# Patient Record
Sex: FEMALE | Race: WHITE | Employment: OTHER | URBAN - METROPOLITAN AREA
[De-identification: names, ages, dates, MRNs, and addresses within clinical notes are randomized per-mention and may not be internally consistent; named-entity substitution may affect disease eponyms.]

---

## 2018-02-07 ENCOUNTER — OFFICE VISIT (OUTPATIENT)
Dept: SURGERY | Age: 73
End: 2018-02-07

## 2018-02-07 VITALS
SYSTOLIC BLOOD PRESSURE: 163 MMHG | OXYGEN SATURATION: 99 % | WEIGHT: 235 LBS | DIASTOLIC BLOOD PRESSURE: 88 MMHG | BODY MASS INDEX: 36.88 KG/M2 | RESPIRATION RATE: 16 BRPM | HEART RATE: 88 BPM | HEIGHT: 67 IN

## 2018-02-07 DIAGNOSIS — I25.118 CORONARY ARTERY DISEASE OF NATIVE HEART WITH STABLE ANGINA PECTORIS, UNSPECIFIED VESSEL OR LESION TYPE (HCC): ICD-10-CM

## 2018-02-07 DIAGNOSIS — I77.9 LEFT-SIDED CAROTID ARTERY DISEASE (HCC): ICD-10-CM

## 2018-02-07 RX ORDER — DILTIAZEM HYDROCHLORIDE 120 MG/1
CAPSULE, COATED, EXTENDED RELEASE ORAL DAILY
COMMUNITY

## 2018-02-07 RX ORDER — ISOSORBIDE MONONITRATE 60 MG/1
TABLET, EXTENDED RELEASE ORAL
COMMUNITY

## 2018-02-07 RX ORDER — ATORVASTATIN CALCIUM 20 MG/1
TABLET, FILM COATED ORAL DAILY
COMMUNITY

## 2018-02-07 RX ORDER — ASPIRIN 81 MG/1
TABLET ORAL DAILY
COMMUNITY

## 2018-02-07 RX ORDER — SERTRALINE HYDROCHLORIDE 100 MG/1
TABLET, FILM COATED ORAL DAILY
COMMUNITY

## 2018-02-07 RX ORDER — METFORMIN HYDROCHLORIDE 500 MG/1
TABLET ORAL 2 TIMES DAILY WITH MEALS
COMMUNITY

## 2018-02-07 RX ORDER — FENOFIBRATE 160 MG/1
160 TABLET ORAL DAILY
COMMUNITY

## 2018-02-07 RX ORDER — LABETALOL 200 MG/1
TABLET, FILM COATED ORAL 2 TIMES DAILY
COMMUNITY

## 2018-02-07 NOTE — PATIENT INSTRUCTIONS
Body Mass Index: Care Instructions  Your Care Instructions    Body mass index (BMI) can help you see if your weight is raising your risk for health problems. It uses a formula to compare how much you weigh with how tall you are. · A BMI lower than 18.5 is considered underweight. · A BMI between 18.5 and 24.9 is considered healthy. · A BMI between 25 and 29.9 is considered overweight. A BMI of 30 or higher is considered obese. If your BMI is in the normal range, it means that you have a lower risk for weight-related health problems. If your BMI is in the overweight or obese range, you may be at increased risk for weight-related health problems, such as high blood pressure, heart disease, stroke, arthritis or joint pain, and diabetes. If your BMI is in the underweight range, you may be at increased risk for health problems such as fatigue, lower protection (immunity) against illness, muscle loss, bone loss, hair loss, and hormone problems. BMI is just one measure of your risk for weight-related health problems. You may be at higher risk for health problems if you are not active, you eat an unhealthy diet, or you drink too much alcohol or use tobacco products. Follow-up care is a key part of your treatment and safety. Be sure to make and go to all appointments, and call your doctor if you are having problems. It's also a good idea to know your test results and keep a list of the medicines you take. How can you care for yourself at home? · Practice healthy eating habits. This includes eating plenty of fruits, vegetables, whole grains, lean protein, and low-fat dairy. · If your doctor recommends it, get more exercise. Walking is a good choice. Bit by bit, increase the amount you walk every day. Try for at least 30 minutes on most days of the week. · Do not smoke. Smoking can increase your risk for health problems. If you need help quitting, talk to your doctor about stop-smoking programs and medicines. These can increase your chances of quitting for good. · Limit alcohol to 2 drinks a day for men and 1 drink a day for women. Too much alcohol can cause health problems. If you have a BMI higher than 25  · Your doctor may do other tests to check your risk for weight-related health problems. This may include measuring the distance around your waist. A waist measurement of more than 40 inches in men or 35 inches in women can increase the risk of weight-related health problems. · Talk with your doctor about steps you can take to stay healthy or improve your health. You may need to make lifestyle changes to lose weight and stay healthy, such as changing your diet and getting regular exercise. If you have a BMI lower than 18.5  · Your doctor may do other tests to check your risk for health problems. · Talk with your doctor about steps you can take to stay healthy or improve your health. You may need to make lifestyle changes to gain or maintain weight and stay healthy, such as getting more healthy foods in your diet and doing exercises to build muscle. Where can you learn more? Go to http://yoav-wilian.info/. Enter S176 in the search box to learn more about \"Body Mass Index: Care Instructions. \"  Current as of: October 13, 2016  Content Version: 11.4  © 7444-5132 [employer]. Care instructions adapted under license by [employer] (which disclaims liability or warranty for this information). If you have questions about a medical condition or this instruction, always ask your healthcare professional. [place] [address] any warranty or liability for your use of this information. Patient Instructions      1. Continue to monitor carbohydrate and protein intake- remember to keep your           total  carbohydrates to 50 grams or less per day for best results.   2. Remember hydration goals - usually 48 to 64 ounces of liquids per day  3. Continue to work towards exercise goals - minimum 3 days per week of 45          minutes to  1 hour at a time. 4. Remember to take vitamins as directed        Supplement Resource Guide    Importance of Protein:   Maintains lean body mass, produces antibodies to fight off infections, heals wounds, minimizes hair loss, helps to give you energy, helps with satiety, and keeping you full between meals. Importance of Calcium:  Needed for healthy bones and teeth, normal blood clotting, and nervous system functioning, higher risk of osteoporosis and bone disease with non-compliance. Importance of Multivitamins: Many functions. Supply you with extra nutrients that you may be missing from food. May lead to iron deficiency anemia, weakness, fatigue, and many other symptoms with non-compliance. Importance of B Vitamins:  Important for red blood cell formation, metabolism, energy, and helps to maintain a healthy nervous system. Protein Supplement  Find one you like now. Use immediately after surgery. Look for:  35-50g protein each day from your protein supplement once you reach the progression diet. 0-3 g fat per serving  0-3 g sugar per serving    Protein drinks should be split in separate dosages. Recommend: Lifelong  1 year + Calcium Supplement:     Start taking within a month after surgery. Look for: Calcium Citrate Plus D (1500 mg per day)  Recommend: Citracal     .          Avoid chocolate chewable calcium. Can use chewable bariatric or [employer] brand or similar chewable. The body cannot absorb more than 500-600 mg @ a time. Take for Life Multi-vitamin Supplement:      1st Month After Surgery: Any complete chewable, such as: [employer]s Complete chewables. Avoid [employer] sours or gummies. They lack iron and other important nutrients and also have added sugar.     Continue with chewable vitamin or change to adult complete multivitamin one month after surgery. Menstruating women can take a prenatal vitamin. Make sure has at least 18 mg iron and 818-290 mcg folic acid):    Vitamin B12, B Complex Vitamin, and Biotin  Start taking within a month after surgery. Vitamin B12:  1000 mcg of Vitamin B12 three times weekly    Must take sublingually (meaning you take it under your tongue) or in a liquid drop form for easy absorption. B Complex Vitamin: Take a pill or liquid drop form once daily. Biotin: This vitamin can help prevent hair loss.     Recommend 5mg   (5000 mcg) a day  Biotin is Optional

## 2018-02-07 NOTE — PROGRESS NOTES
Subjective:     Vibha Roblero  is a 71 y.o. female who presents for follow-up about 11 years following laparoscopic adjustable gastric band surgery. She has lost a total of 30 pounds since surgery. Body mass index is 36.81 kg/(m^2). Brenna Walker The patient presents today to assess their progress toward their goal of weight loss and to address any issues that may be present. Today the patient and I have reviewed their diet and how appropriate their food choices are. The following issues have been identified : feels little restriction. We loosened her band 2 years ago due to vomiting and esophageal dilation. .  Surgery related complication: 0       She reports no issues other than hunger and denies vomiting and abdominal pain. The patients diet choices have been reviewed today and are  good  Patients pain score:0    The patient's exercise level: moderately active. Changes in her medical history and medications have been reviewed.     Patient Active Problem List   Diagnosis Code    Sleep apnea G47.30    Diabetes ([address] E11.9    Hypertension I10    Hypercholesterolemia E78.00    Arthritis M19.90    Carotid arterial disease ([address] I77.9    Coronary artery disease I25.10     Past Medical History:   Diagnosis Date    Arthritis     Carotid arterial disease (HCC)      s/p left stent    Coronary artery disease     s/p stents x 3    Diabetes ([address]     Diabetes ([address]     Female stress incontinence     GERD (gastroesophageal reflux disease)     Hypercholesterolemia     Hypertension     Mixed hyperlipidemia     Morbid obesity ([address]     Sleep apnea      Past Surgical History:   Procedure Laterality Date    LAP, PLACE ADJUST GASTR BAND  3/20/07          Review of Symptoms:       General - No history or complaints of unexpected fever or chills  Head/Neck - No history or complaints of headache or dizziness  Cardiac - No history or complaints of chest pain, palpitations, or shortness of breath  Pulmonary - No history or complaints of shortness of breath or productive cough  Gastrointestinal - as noted above  Genitourinary - No history or complaints of hematuria/dysuria or renal lithiasis  Musculoskeletal - No history or complaints of joint  muscular weakness  Hematologic - No history of any bleeding episodes  Neurologic - No history or complaints of  migraine headaches or neurologic symptoms                     Objective:     Visit Vitals    /88 (BP 1 Location: Left arm, BP Patient Position: Sitting)    Pulse 88    Resp 16    Ht 5' 7\" (1.702 m)    Wt 106.6 kg (235 lb)    SpO2 99%    BMI 36.81 kg/m2        Physical Exam:    General:  alert, cooperative, no distress, appears stated age   Lungs:   clear to auscultation bilaterally   Heart:  Regular rate and rhythm   Abdomen:   abdomen is soft without significant tenderness, masses, organomegaly or guarding; Incisions: healing well       Lab Results   Component Value Date/Time    WBC 12.8 02/12/2012 04:55 AM    Hemoglobin, POC 10.5 (L) 02/09/2012 07:37 PM    HGB 9.1 (L) 02/12/2012 04:55 AM    Hematocrit, POC 31 (L) 02/09/2012 07:37 PM    HCT 27.3 (L) 02/12/2012 04:55 AM    PLATELET 334 59/81/0759 04:55 AM    MCV 82.7 02/12/2012 04:55 AM     Lab Results   Component Value Date/Time    Sodium 139 02/13/2012 04:43 AM    Potassium 3.9 02/13/2012 04:43 AM    Chloride 105 02/13/2012 04:43 AM    CO2 28 02/13/2012 04:43 AM    Anion gap 6 02/13/2012 04:43 AM    Glucose 112 (H) 02/13/2012 04:43 AM    BUN 14 02/13/2012 04:43 AM    Creatinine 0.8 02/13/2012 04:43 AM    BUN/Creatinine ratio 18 02/13/2012 04:43 AM    GFR est AA >60 02/13/2012 04:43 AM    GFR est non-AA >60 02/13/2012 04:43 AM    Calcium 8.7 02/13/2012 04:43 AM    Bilirubin, total 0.4 08/15/2011 04:10 AM    AST (SGOT) 42 (H) 08/15/2011 04:10 AM    Alk.  phosphatase 77 08/15/2011 04:10 AM    Protein, total 6.3 (L) 08/15/2011 04:10 AM    Albumin 3.1 (L) 08/15/2011 04:10 AM    Globulin 3.2 08/15/2011 04:10 AM    A-G Ratio 1.0 08/15/2011 04:10 AM    ALT (SGPT) 49 08/15/2011 04:10 AM     No results found for: IRON, FE, TIBC, IBCT, PSAT, FERR  No results found for: FOL, RBCF  No results found for: VITD3, Sujata Busby, XQVID, VD3RIA        Assessment:     1. History of Morbid obesity, status post  laparoscopic adjustable gastric band surgery. Requests fill today. Plan:     1. Remember to measure portions, continue low carbohydrate diet  2. Continue to concentrate on protein intake meeting daily requirements  3. Remember vitamin supplements. The importance of such was discussed regarding the malabsorptive issues that the surgery creates. 4. Exercise regimen appears to be: fair  5. Try and attend support group if feasible. 6. Follow-up in 1 month(s) for UGI. 7. Will perform adjustment today  8. Total time spent with the patient 30 minutes. [facility]  OFFICE PROCEDURE PROGRESS NOTE        Chart reviewed for the following:   Tyson Merrill MD, have reviewed the History, Physical and updated the Allergic reactions for TOBESOFT performed immediately prior to start of procedure:   Tyson Merrill MD, have performed the following reviews on Jesus Corral prior to the start of the procedure:            * Patient was identified by name and date of birth   * Agreement on procedure being performed was verified  * Risks and Benefits explained to the patient  * Procedure site verified and marked as necessary  * Patient was positioned for comfort  * Consent was signed and verified     Time: 10:00am      Date of procedure: 2/7/2018    Procedure performed by:   Pippa Murphy MD    Provider assisted by: none    Patient assisted by: self    How tolerated by patient: tolerated the procedure well with no complications    Post Procedural Pain Scale: 0 - No Hurt    Comments: none                    Subjective:   Jesus Corral is a 71 y.o. female with previous lap band surgery, who is here today needing lap band adjustment because of decreased satiety (pt. able to eat > 4 oz at one meal), early sense of hunger (within 1-2 hours after eating) and poor weight loss. Dietary compliance is good. Objective:     Visit Vitals    /88 (BP 1 Location: Left arm, BP Patient Position: Sitting)    Pulse 88    Resp 16    Ht 5' 7\" (1.702 m)    Wt 106.6 kg (235 lb)    SpO2 99%    BMI 36.81 kg/m2      Estimated body mass index is 36.81 kg/(m^2) as calculated from the following:    Height as of this encounter: 5' 7\" (1.702 m). Weight as of this encounter: 106.6 kg (235 lb). Wt Readings from Last 3 Encounters:   02/07/18 106.6 kg (235 lb)   10/21/15 99.3 kg (218 lb 14.4 oz)   12/19/12 104 kg (229 lb 6 oz)     Her change in weight since last visit: gained, 15 lbs. The surgical area looks well healed and the port is properly located. Band Position: n/a. Assessment/Plan: Morbid Obesity, status post lap-band surgery, needing fill adjustment    Lap Band FiIl Procedure    The port area was cleaned with alcohol and a 20 gauge needle was inserted. Previous Fill Volume:  7.3 cc. Added:  .3 cc   Total amount now in Band 7.6 ml    Patient tolerated the procedure well.   Follow up in 4 to 6 weeks for UGI

## 2020-12-29 ENCOUNTER — TRANSCRIBE ORDER (OUTPATIENT)
Dept: SCHEDULING | Age: 75
End: 2020-12-29

## 2020-12-29 DIAGNOSIS — M25.562 LEFT KNEE PAIN: Primary | ICD-10-CM

## 2021-01-04 ENCOUNTER — HOSPITAL ENCOUNTER (OUTPATIENT)
Dept: NUCLEAR MEDICINE | Age: 76
Discharge: HOME | End: 2021-01-04
Attending: ORTHOPAEDIC SURGERY
Payer: MEDICARE

## 2021-01-04 DIAGNOSIS — M25.562 LEFT KNEE PAIN: ICD-10-CM

## 2021-01-04 PROCEDURE — A9503 TC99M MEDRONATE: HCPCS

## 2023-04-26 ENCOUNTER — HOSPITAL ENCOUNTER (OUTPATIENT)
Facility: HOSPITAL | Age: 78
Discharge: HOME | End: 2023-04-29
Payer: MEDICARE

## 2023-04-26 DIAGNOSIS — M16.12 PRIMARY OSTEOARTHRITIS OF LEFT HIP: ICD-10-CM

## 2023-04-26 LAB
ABO + RH BLD: NORMAL
ALBUMIN SERPL-MCNC: 3.4 G/DL (ref 3.4–5)
ALBUMIN/GLOB SERPL: 1 (ref 0.8–1.7)
ALP SERPL-CCNC: 102 U/L (ref 45–117)
ALT SERPL-CCNC: 28 U/L (ref 13–56)
ANION GAP SERPL CALC-SCNC: 6 MMOL/L (ref 3–18)
APPEARANCE UR: ABNORMAL
APTT PPP: 30.6 SEC (ref 23–36.4)
AST SERPL-CCNC: 23 U/L (ref 10–38)
BILIRUB SERPL-MCNC: 0.6 MG/DL (ref 0.2–1)
BILIRUB UR QL: NEGATIVE
BLOOD GROUP ANTIBODIES SERPL: NORMAL
BUN SERPL-MCNC: 21 MG/DL (ref 7–18)
BUN/CREAT SERPL: 22 (ref 12–20)
CALCIUM SERPL-MCNC: 10.4 MG/DL (ref 8.5–10.1)
CAOX CRY URNS QL MICRO: ABNORMAL
CHLORIDE SERPL-SCNC: 106 MMOL/L (ref 100–111)
CO2 SERPL-SCNC: 29 MMOL/L (ref 21–32)
COLOR UR: ABNORMAL
CREAT SERPL-MCNC: 0.94 MG/DL (ref 0.6–1.3)
EPITH CASTS URNS QL MICRO: ABNORMAL /LPF (ref 0–5)
ERYTHROCYTE [SEDIMENTATION RATE] IN BLOOD: 33 MM/HR (ref 0–30)
EST. AVERAGE GLUCOSE BLD GHB EST-MCNC: 140 MG/DL
GLOBULIN SER CALC-MCNC: 3.3 G/DL (ref 2–4)
GLUCOSE SERPL-MCNC: 130 MG/DL (ref 74–99)
GLUCOSE UR STRIP.AUTO-MCNC: >1000 MG/DL
HBA1C MFR BLD: 6.5 % (ref 4.2–5.6)
HGB UR QL STRIP: ABNORMAL
HYALINE CASTS URNS QL MICRO: ABNORMAL /LPF (ref 0–2)
INR PPP: 1.1 (ref 0.8–1.2)
KETONES UR QL STRIP.AUTO: NEGATIVE MG/DL
LEUKOCYTE ESTERASE UR QL STRIP.AUTO: ABNORMAL
NITRITE UR QL STRIP.AUTO: NEGATIVE
PH UR STRIP: 5 (ref 5–8)
POTASSIUM SERPL-SCNC: 4.4 MMOL/L (ref 3.5–5.5)
PROT SERPL-MCNC: 6.7 G/DL (ref 6.4–8.2)
PROT UR STRIP-MCNC: 100 MG/DL
PROTHROMBIN TIME: 15 SEC (ref 11.5–15.2)
RBC #/AREA URNS HPF: ABNORMAL /HPF (ref 0–5)
SODIUM SERPL-SCNC: 141 MMOL/L (ref 136–145)
SP GR UR REFRACTOMETRY: >1.03 (ref 1–1.03)
SPECIMEN EXP DATE BLD: NORMAL
UROBILINOGEN UR QL STRIP.AUTO: 0.2 EU/DL (ref 0.2–1)
WBC URNS QL MICRO: ABNORMAL /HPF (ref 0–5)

## 2023-04-26 PROCEDURE — 86900 BLOOD TYPING SEROLOGIC ABO: CPT

## 2023-04-26 PROCEDURE — 86850 RBC ANTIBODY SCREEN: CPT

## 2023-04-26 PROCEDURE — 85652 RBC SED RATE AUTOMATED: CPT

## 2023-04-26 PROCEDURE — 86901 BLOOD TYPING SEROLOGIC RH(D): CPT

## 2023-04-26 PROCEDURE — 85610 PROTHROMBIN TIME: CPT

## 2023-04-26 PROCEDURE — 93005 ELECTROCARDIOGRAM TRACING: CPT | Performed by: ORTHOPAEDIC SURGERY

## 2023-04-26 PROCEDURE — 81001 URINALYSIS AUTO W/SCOPE: CPT

## 2023-04-26 PROCEDURE — 80053 COMPREHEN METABOLIC PANEL: CPT

## 2023-04-26 PROCEDURE — 36415 COLL VENOUS BLD VENIPUNCTURE: CPT

## 2023-04-26 PROCEDURE — 85730 THROMBOPLASTIN TIME PARTIAL: CPT

## 2023-04-26 PROCEDURE — 83036 HEMOGLOBIN GLYCOSYLATED A1C: CPT

## 2023-04-26 PROCEDURE — 87086 URINE CULTURE/COLONY COUNT: CPT

## 2023-04-27 LAB
BACTERIA SPEC CULT: NORMAL
BACTERIA SPEC CULT: NORMAL
EKG DIAGNOSIS: NORMAL
EKG Q-T INTERVAL: 414 MS
EKG QRS DURATION: 86 MS
EKG QTC CALCULATION (BAZETT): 483 MS
EKG R AXIS: 59 DEGREES
EKG T AXIS: 40 DEGREES
EKG VENTRICULAR RATE: 82 BPM
SERVICE CMNT-IMP: NORMAL

## 2023-04-28 LAB
BACTERIA SPEC CULT: NORMAL
CC UR VC: NORMAL
SERVICE CMNT-IMP: NORMAL

## 2023-08-03 ENCOUNTER — HOSPITAL ENCOUNTER (OUTPATIENT)
Facility: HOSPITAL | Age: 78
Discharge: HOME | End: 2023-08-03
Payer: MEDICARE

## 2023-08-03 DIAGNOSIS — M16.12 PRIMARY OSTEOARTHRITIS OF LEFT HIP: ICD-10-CM

## 2023-08-03 LAB
ALBUMIN SERPL-MCNC: 3.9 G/DL (ref 3.4–5)
ALBUMIN/GLOB SERPL: 1.1 (ref 0.8–1.7)
ALP SERPL-CCNC: 111 U/L (ref 45–117)
ALT SERPL-CCNC: 16 U/L (ref 13–56)
ANION GAP SERPL CALC-SCNC: 3 MMOL/L (ref 3–18)
APPEARANCE UR: ABNORMAL
APTT PPP: 30.6 SEC (ref 23–36.4)
AST SERPL-CCNC: 13 U/L (ref 10–38)
BACTERIA URNS QL MICRO: ABNORMAL /HPF
BILIRUB SERPL-MCNC: 0.6 MG/DL (ref 0.2–1)
BILIRUB UR QL: NEGATIVE
BUN SERPL-MCNC: 24 MG/DL (ref 7–18)
BUN/CREAT SERPL: 22 (ref 12–20)
CALCIUM SERPL-MCNC: 10.7 MG/DL (ref 8.5–10.1)
CAOX CRY URNS QL MICRO: ABNORMAL
CHLORIDE SERPL-SCNC: 107 MMOL/L (ref 100–111)
CO2 SERPL-SCNC: 29 MMOL/L (ref 21–32)
COLOR UR: YELLOW
CREAT SERPL-MCNC: 1.07 MG/DL (ref 0.6–1.3)
EPITH CASTS URNS QL MICRO: ABNORMAL /LPF (ref 0–5)
ERYTHROCYTE [DISTWIDTH] IN BLOOD BY AUTOMATED COUNT: 17.2 % (ref 11.6–14.5)
ERYTHROCYTE [SEDIMENTATION RATE] IN BLOOD: 3 MM/HR (ref 0–30)
EST. AVERAGE GLUCOSE BLD GHB EST-MCNC: 123 MG/DL
GLOBULIN SER CALC-MCNC: 3.5 G/DL (ref 2–4)
GLUCOSE SERPL-MCNC: 129 MG/DL (ref 74–99)
GLUCOSE UR STRIP.AUTO-MCNC: >1000 MG/DL
HBA1C MFR BLD: 5.9 % (ref 4.2–5.6)
HCT VFR BLD AUTO: 49.9 % (ref 35–45)
HGB BLD-MCNC: 15.5 G/DL (ref 12–16)
HGB UR QL STRIP: NEGATIVE
INR PPP: 1.3 (ref 0.9–1.1)
KETONES UR QL STRIP.AUTO: ABNORMAL MG/DL
LEUKOCYTE ESTERASE UR QL STRIP.AUTO: ABNORMAL
MCH RBC QN AUTO: 25.7 PG (ref 24–34)
MCHC RBC AUTO-ENTMCNC: 31.1 G/DL (ref 31–37)
MCV RBC AUTO: 82.9 FL (ref 78–100)
NITRITE UR QL STRIP.AUTO: POSITIVE
NRBC # BLD: 0 K/UL (ref 0–0.01)
NRBC BLD-RTO: 0 PER 100 WBC
PH UR STRIP: 5.5 (ref 5–8)
PLATELET # BLD AUTO: 255 K/UL (ref 135–420)
PMV BLD AUTO: 11.3 FL (ref 9.2–11.8)
POTASSIUM SERPL-SCNC: 4.8 MMOL/L (ref 3.5–5.5)
PROT SERPL-MCNC: 7.4 G/DL (ref 6.4–8.2)
PROT UR STRIP-MCNC: 30 MG/DL
PROTHROMBIN TIME: 16.1 SEC (ref 11.9–14.7)
RBC # BLD AUTO: 6.02 M/UL (ref 4.2–5.3)
RBC #/AREA URNS HPF: ABNORMAL /HPF (ref 0–5)
SODIUM SERPL-SCNC: 139 MMOL/L (ref 136–145)
SP GR UR REFRACTOMETRY: >1.03 (ref 1–1.03)
UROBILINOGEN UR QL STRIP.AUTO: 1 EU/DL (ref 0.2–1)
WBC # BLD AUTO: 10.8 K/UL (ref 4.6–13.2)
WBC URNS QL MICRO: ABNORMAL /HPF (ref 0–5)

## 2023-08-03 PROCEDURE — 81001 URINALYSIS AUTO W/SCOPE: CPT

## 2023-08-03 PROCEDURE — 36415 COLL VENOUS BLD VENIPUNCTURE: CPT

## 2023-08-03 PROCEDURE — 87086 URINE CULTURE/COLONY COUNT: CPT

## 2023-08-03 PROCEDURE — 80053 COMPREHEN METABOLIC PANEL: CPT

## 2023-08-03 PROCEDURE — 87186 SC STD MICRODIL/AGAR DIL: CPT

## 2023-08-03 PROCEDURE — 85610 PROTHROMBIN TIME: CPT

## 2023-08-03 PROCEDURE — 83036 HEMOGLOBIN GLYCOSYLATED A1C: CPT

## 2023-08-03 PROCEDURE — 87077 CULTURE AEROBIC IDENTIFY: CPT

## 2023-08-03 PROCEDURE — 85027 COMPLETE CBC AUTOMATED: CPT

## 2023-08-03 PROCEDURE — 93005 ELECTROCARDIOGRAM TRACING: CPT

## 2023-08-03 PROCEDURE — 85652 RBC SED RATE AUTOMATED: CPT

## 2023-08-03 PROCEDURE — 85730 THROMBOPLASTIN TIME PARTIAL: CPT

## 2023-08-04 LAB
EKG DIAGNOSIS: NORMAL
EKG Q-T INTERVAL: 402 MS
EKG QRS DURATION: 86 MS
EKG QTC CALCULATION (BAZETT): 472 MS
EKG R AXIS: 58 DEGREES
EKG T AXIS: 2 DEGREES
EKG VENTRICULAR RATE: 83 BPM

## 2023-08-05 LAB
BACTERIA SPEC CULT: NORMAL
BACTERIA SPEC CULT: NORMAL
SERVICE CMNT-IMP: NORMAL

## 2023-08-06 LAB
BACTERIA SPEC CULT: ABNORMAL
CC UR VC: ABNORMAL
SERVICE CMNT-IMP: ABNORMAL
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
## 2023-08-20 ENCOUNTER — ANESTHESIA EVENT (OUTPATIENT)
Facility: HOSPITAL | Age: 78
End: 2023-08-20
Payer: MEDICARE

## 2023-08-22 ENCOUNTER — HOSPITAL ENCOUNTER (INPATIENT)
Facility: HOSPITAL | Age: 78
LOS: 2 days | Discharge: HOME | DRG: 470 | End: 2023-08-25
Attending: ORTHOPAEDIC SURGERY | Admitting: ORTHOPAEDIC SURGERY
Payer: MEDICARE

## 2023-08-22 ENCOUNTER — ANESTHESIA (OUTPATIENT)
Facility: HOSPITAL | Age: 78
End: 2023-08-22
Payer: MEDICARE

## 2023-08-22 ENCOUNTER — APPOINTMENT (OUTPATIENT)
Facility: HOSPITAL | Age: 78
DRG: 470 | End: 2023-08-22
Attending: ORTHOPAEDIC SURGERY
Payer: MEDICARE

## 2023-08-22 DIAGNOSIS — Z96.642 S/P HIP REPLACEMENT, LEFT: Primary | ICD-10-CM

## 2023-08-22 PROBLEM — M16.9 OA (OSTEOARTHRITIS) OF HIP: Status: ACTIVE | Noted: 2023-08-22

## 2023-08-22 LAB
ABO + RH BLD: NORMAL
BLOOD GROUP ANTIBODIES SERPL: NORMAL
GLUCOSE BLD STRIP.AUTO-MCNC: 119 MG/DL (ref 70–110)
GLUCOSE BLD STRIP.AUTO-MCNC: 128 MG/DL (ref 70–110)
GLUCOSE BLD STRIP.AUTO-MCNC: 200 MG/DL (ref 70–110)
GLUCOSE BLD STRIP.AUTO-MCNC: 351 MG/DL (ref 70–110)
SPECIMEN EXP DATE BLD: NORMAL

## 2023-08-22 PROCEDURE — 6360000002 HC RX W HCPCS: Performed by: ORTHOPAEDIC SURGERY

## 2023-08-22 PROCEDURE — 86850 RBC ANTIBODY SCREEN: CPT

## 2023-08-22 PROCEDURE — 6370000000 HC RX 637 (ALT 250 FOR IP): Performed by: ORTHOPAEDIC SURGERY

## 2023-08-22 PROCEDURE — 3700000001 HC ADD 15 MINUTES (ANESTHESIA): Performed by: ORTHOPAEDIC SURGERY

## 2023-08-22 PROCEDURE — 3600000012 HC SURGERY LEVEL 2 ADDTL 15MIN: Performed by: ORTHOPAEDIC SURGERY

## 2023-08-22 PROCEDURE — G0378 HOSPITAL OBSERVATION PER HR: HCPCS

## 2023-08-22 PROCEDURE — 7100000001 HC PACU RECOVERY - ADDTL 15 MIN: Performed by: ORTHOPAEDIC SURGERY

## 2023-08-22 PROCEDURE — 0SRB0JA REPLACEMENT OF LEFT HIP JOINT WITH SYNTHETIC SUBSTITUTE, UNCEMENTED, OPEN APPROACH: ICD-10-PCS | Performed by: ORTHOPAEDIC SURGERY

## 2023-08-22 PROCEDURE — 7100000000 HC PACU RECOVERY - FIRST 15 MIN: Performed by: ORTHOPAEDIC SURGERY

## 2023-08-22 PROCEDURE — 6360000002 HC RX W HCPCS: Performed by: ANESTHESIOLOGY

## 2023-08-22 PROCEDURE — 6370000000 HC RX 637 (ALT 250 FOR IP): Performed by: PHYSICIAN ASSISTANT

## 2023-08-22 PROCEDURE — 2500000003 HC RX 250 WO HCPCS: Performed by: ORTHOPAEDIC SURGERY

## 2023-08-22 PROCEDURE — 2580000003 HC RX 258: Performed by: ORTHOPAEDIC SURGERY

## 2023-08-22 PROCEDURE — A4217 STERILE WATER/SALINE, 500 ML: HCPCS | Performed by: ORTHOPAEDIC SURGERY

## 2023-08-22 PROCEDURE — 82962 GLUCOSE BLOOD TEST: CPT

## 2023-08-22 PROCEDURE — 6360000002 HC RX W HCPCS: Performed by: NURSE ANESTHETIST, CERTIFIED REGISTERED

## 2023-08-22 PROCEDURE — C9290 INJ, BUPIVACAINE LIPOSOME: HCPCS | Performed by: ORTHOPAEDIC SURGERY

## 2023-08-22 PROCEDURE — 97116 GAIT TRAINING THERAPY: CPT

## 2023-08-22 PROCEDURE — 73501 X-RAY EXAM HIP UNI 1 VIEW: CPT

## 2023-08-22 PROCEDURE — 3700000000 HC ANESTHESIA ATTENDED CARE: Performed by: ORTHOPAEDIC SURGERY

## 2023-08-22 PROCEDURE — 36415 COLL VENOUS BLD VENIPUNCTURE: CPT

## 2023-08-22 PROCEDURE — 2580000003 HC RX 258: Performed by: PHYSICIAN ASSISTANT

## 2023-08-22 PROCEDURE — 2500000003 HC RX 250 WO HCPCS: Performed by: NURSE ANESTHETIST, CERTIFIED REGISTERED

## 2023-08-22 PROCEDURE — 97161 PT EVAL LOW COMPLEX 20 MIN: CPT

## 2023-08-22 PROCEDURE — C1776 JOINT DEVICE (IMPLANTABLE): HCPCS | Performed by: ORTHOPAEDIC SURGERY

## 2023-08-22 PROCEDURE — 6360000002 HC RX W HCPCS: Performed by: PHYSICIAN ASSISTANT

## 2023-08-22 PROCEDURE — C1713 ANCHOR/SCREW BN/BN,TIS/BN: HCPCS | Performed by: ORTHOPAEDIC SURGERY

## 2023-08-22 PROCEDURE — 86900 BLOOD TYPING SEROLOGIC ABO: CPT

## 2023-08-22 PROCEDURE — 3600000002 HC SURGERY LEVEL 2 BASE: Performed by: ORTHOPAEDIC SURGERY

## 2023-08-22 PROCEDURE — 97535 SELF CARE MNGMENT TRAINING: CPT

## 2023-08-22 PROCEDURE — 2709999900 HC NON-CHARGEABLE SUPPLY: Performed by: ORTHOPAEDIC SURGERY

## 2023-08-22 PROCEDURE — 97165 OT EVAL LOW COMPLEX 30 MIN: CPT

## 2023-08-22 PROCEDURE — 86901 BLOOD TYPING SEROLOGIC RH(D): CPT

## 2023-08-22 DEVICE — ACTIS DUOFIX HIP PROSTHESIS (FEMORAL STEM 12/14 TAPER CEMENTLESS SIZE 11 STD COLLAR)  CE
Type: IMPLANTABLE DEVICE | Site: HIP | Status: FUNCTIONAL
Brand: ACTIS

## 2023-08-22 DEVICE — BI MENTUM PFR PE LINER 45MM22MM: Type: IMPLANTABLE DEVICE | Site: HIP | Status: FUNCTIONAL

## 2023-08-22 DEVICE — CUP ACET DIA52MM HIP GRIPTION PRI CEMENTLESS FIX SECT SER: Type: IMPLANTABLE DEVICE | Site: HIP | Status: FUNCTIONAL

## 2023-08-22 DEVICE — ELIMINATOR H APEX FOR 48-60MM PINN HIP SHELL: Type: IMPLANTABLE DEVICE | Site: HIP | Status: FUNCTIONAL

## 2023-08-22 DEVICE — HEAD FEM DIA22.225MM +4MM OFFSET 12/14 TAPR HIP MTL ARTC EZ: Type: IMPLANTABLE DEVICE | Site: HIP | Status: FUNCTIONAL

## 2023-08-22 DEVICE — LINER MOBILITY CAPPED HIP DUAL 52/45: Type: IMPLANTABLE DEVICE | Site: HIP | Status: FUNCTIONAL

## 2023-08-22 RX ORDER — CLOPIDOGREL BISULFATE 75 MG/1
75 TABLET ORAL DAILY
Status: DISCONTINUED | OUTPATIENT
Start: 2023-08-22 | End: 2023-08-25 | Stop reason: HOSPADM

## 2023-08-22 RX ORDER — HYDROMORPHONE HYDROCHLORIDE 1 MG/ML
0.5 INJECTION, SOLUTION INTRAMUSCULAR; INTRAVENOUS; SUBCUTANEOUS EVERY 5 MIN PRN
Status: COMPLETED | OUTPATIENT
Start: 2023-08-22 | End: 2023-08-22

## 2023-08-22 RX ORDER — TRANEXAMIC ACID 10 MG/ML
1000 INJECTION, SOLUTION INTRAVENOUS ONCE
Status: DISCONTINUED | OUTPATIENT
Start: 2023-08-22 | End: 2023-08-25 | Stop reason: HOSPADM

## 2023-08-22 RX ORDER — OXYCODONE HYDROCHLORIDE 5 MG/1
10 TABLET ORAL EVERY 4 HOURS PRN
Status: DISCONTINUED | OUTPATIENT
Start: 2023-08-22 | End: 2023-08-23

## 2023-08-22 RX ORDER — ONDANSETRON 2 MG/ML
4 INJECTION INTRAMUSCULAR; INTRAVENOUS EVERY 6 HOURS PRN
Status: DISCONTINUED | OUTPATIENT
Start: 2023-08-22 | End: 2023-08-25 | Stop reason: HOSPADM

## 2023-08-22 RX ORDER — ONDANSETRON 2 MG/ML
4 INJECTION INTRAMUSCULAR; INTRAVENOUS
Status: DISCONTINUED | OUTPATIENT
Start: 2023-08-22 | End: 2023-08-22

## 2023-08-22 RX ORDER — DIPHENHYDRAMINE HCL 25 MG
25 CAPSULE ORAL EVERY 6 HOURS PRN
Status: DISCONTINUED | OUTPATIENT
Start: 2023-08-22 | End: 2023-08-25 | Stop reason: HOSPADM

## 2023-08-22 RX ORDER — SODIUM CHLORIDE 0.9 % (FLUSH) 0.9 %
5-40 SYRINGE (ML) INJECTION PRN
Status: DISCONTINUED | OUTPATIENT
Start: 2023-08-22 | End: 2023-08-25 | Stop reason: HOSPADM

## 2023-08-22 RX ORDER — SODIUM CHLORIDE, SODIUM LACTATE, POTASSIUM CHLORIDE, CALCIUM CHLORIDE 600; 310; 30; 20 MG/100ML; MG/100ML; MG/100ML; MG/100ML
INJECTION, SOLUTION INTRAVENOUS CONTINUOUS
Status: DISCONTINUED | OUTPATIENT
Start: 2023-08-22 | End: 2023-08-24

## 2023-08-22 RX ORDER — CLONIDINE HYDROCHLORIDE 0.1 MG/1
0.1 TABLET ORAL 2 TIMES DAILY PRN
Status: DISCONTINUED | OUTPATIENT
Start: 2023-08-22 | End: 2023-08-25 | Stop reason: HOSPADM

## 2023-08-22 RX ORDER — DILTIAZEM HYDROCHLORIDE 120 MG/1
120 CAPSULE, COATED, EXTENDED RELEASE ORAL DAILY
Status: DISCONTINUED | OUTPATIENT
Start: 2023-08-22 | End: 2023-08-25 | Stop reason: HOSPADM

## 2023-08-22 RX ORDER — PROPOFOL 10 MG/ML
INJECTION, EMULSION INTRAVENOUS PRN
Status: DISCONTINUED | OUTPATIENT
Start: 2023-08-22 | End: 2023-08-22 | Stop reason: SDUPTHER

## 2023-08-22 RX ORDER — SODIUM CHLORIDE 0.9 % (FLUSH) 0.9 %
5-40 SYRINGE (ML) INJECTION PRN
Status: DISCONTINUED | OUTPATIENT
Start: 2023-08-22 | End: 2023-08-22

## 2023-08-22 RX ORDER — MAGNESIUM HYDROXIDE 1200 MG/15ML
LIQUID ORAL CONTINUOUS PRN
Status: COMPLETED | OUTPATIENT
Start: 2023-08-22 | End: 2023-08-22

## 2023-08-22 RX ORDER — OXYCODONE HYDROCHLORIDE 5 MG/1
5 TABLET ORAL EVERY 4 HOURS PRN
Status: DISCONTINUED | OUTPATIENT
Start: 2023-08-22 | End: 2023-08-23

## 2023-08-22 RX ORDER — MELOXICAM 7.5 MG/1
15 TABLET ORAL DAILY
Status: COMPLETED | OUTPATIENT
Start: 2023-08-22 | End: 2023-08-24

## 2023-08-22 RX ORDER — INSULIN LISPRO 100 [IU]/ML
2 INJECTION, SOLUTION INTRAVENOUS; SUBCUTANEOUS
Status: DISCONTINUED | OUTPATIENT
Start: 2023-08-22 | End: 2023-08-23

## 2023-08-22 RX ORDER — LABETALOL HYDROCHLORIDE 5 MG/ML
10 INJECTION, SOLUTION INTRAVENOUS
Status: COMPLETED | OUTPATIENT
Start: 2023-08-22 | End: 2023-08-22

## 2023-08-22 RX ORDER — HYDRALAZINE HYDROCHLORIDE 20 MG/ML
INJECTION INTRAMUSCULAR; INTRAVENOUS
Status: DISPENSED
Start: 2023-08-22 | End: 2023-08-23

## 2023-08-22 RX ORDER — CEPHALEXIN 500 MG/1
1000 CAPSULE ORAL EVERY 8 HOURS
Qty: 4 CAPSULE | Refills: 0
Start: 2023-08-22 | End: 2023-08-23 | Stop reason: HOSPADM

## 2023-08-22 RX ORDER — SODIUM CHLORIDE 9 MG/ML
INJECTION, SOLUTION INTRAVENOUS PRN
Status: DISCONTINUED | OUTPATIENT
Start: 2023-08-22 | End: 2023-08-25 | Stop reason: HOSPADM

## 2023-08-22 RX ORDER — SODIUM CHLORIDE, SODIUM LACTATE, POTASSIUM CHLORIDE, AND CALCIUM CHLORIDE .6; .31; .03; .02 G/100ML; G/100ML; G/100ML; G/100ML
IRRIGANT IRRIGATION PRN
Status: DISCONTINUED | OUTPATIENT
Start: 2023-08-22 | End: 2023-08-22 | Stop reason: ALTCHOICE

## 2023-08-22 RX ORDER — TRANEXAMIC ACID 10 MG/ML
INJECTION, SOLUTION INTRAVENOUS PRN
Status: DISCONTINUED | OUTPATIENT
Start: 2023-08-22 | End: 2023-08-22 | Stop reason: SDUPTHER

## 2023-08-22 RX ORDER — CHLORHEXIDINE GLUCONATE 4 G/100ML
SOLUTION TOPICAL ONCE
Status: COMPLETED | OUTPATIENT
Start: 2023-08-22 | End: 2023-08-22

## 2023-08-22 RX ORDER — MELOXICAM 15 MG/1
15 TABLET ORAL DAILY
Qty: 30 TABLET | Refills: 1
Start: 2023-08-22 | End: 2023-10-21

## 2023-08-22 RX ORDER — OXYCODONE HYDROCHLORIDE 5 MG/1
5 TABLET ORAL
Status: DISCONTINUED | OUTPATIENT
Start: 2023-08-22 | End: 2023-08-22

## 2023-08-22 RX ORDER — DEXTROSE MONOHYDRATE 100 MG/ML
INJECTION, SOLUTION INTRAVENOUS CONTINUOUS PRN
Status: DISCONTINUED | OUTPATIENT
Start: 2023-08-22 | End: 2023-08-25 | Stop reason: HOSPADM

## 2023-08-22 RX ORDER — LABETALOL HYDROCHLORIDE 5 MG/ML
15 INJECTION, SOLUTION INTRAVENOUS ONCE
Status: COMPLETED | OUTPATIENT
Start: 2023-08-22 | End: 2023-08-22

## 2023-08-22 RX ORDER — DIPHENHYDRAMINE HYDROCHLORIDE 50 MG/ML
12.5 INJECTION INTRAMUSCULAR; INTRAVENOUS
Status: DISCONTINUED | OUTPATIENT
Start: 2023-08-22 | End: 2023-08-22

## 2023-08-22 RX ORDER — LABETALOL 200 MG/1
200 TABLET, FILM COATED ORAL 2 TIMES DAILY
Status: DISCONTINUED | OUTPATIENT
Start: 2023-08-22 | End: 2023-08-25 | Stop reason: HOSPADM

## 2023-08-22 RX ORDER — MORPHINE SULFATE 2 MG/ML
2 INJECTION, SOLUTION INTRAMUSCULAR; INTRAVENOUS
Status: DISCONTINUED | OUTPATIENT
Start: 2023-08-22 | End: 2023-08-23

## 2023-08-22 RX ORDER — FENTANYL CITRATE 50 UG/ML
25 INJECTION, SOLUTION INTRAMUSCULAR; INTRAVENOUS EVERY 5 MIN PRN
Status: COMPLETED | OUTPATIENT
Start: 2023-08-22 | End: 2023-08-22

## 2023-08-22 RX ORDER — ONDANSETRON 4 MG/1
4 TABLET, ORALLY DISINTEGRATING ORAL EVERY 8 HOURS PRN
Status: DISCONTINUED | OUTPATIENT
Start: 2023-08-22 | End: 2023-08-25 | Stop reason: HOSPADM

## 2023-08-22 RX ORDER — HYDROMORPHONE HYDROCHLORIDE 1 MG/ML
0.5 INJECTION, SOLUTION INTRAMUSCULAR; INTRAVENOUS; SUBCUTANEOUS EVERY 5 MIN PRN
Status: DISCONTINUED | OUTPATIENT
Start: 2023-08-22 | End: 2023-08-23

## 2023-08-22 RX ORDER — ROSUVASTATIN CALCIUM 10 MG/1
20 TABLET, COATED ORAL DAILY
Status: DISCONTINUED | OUTPATIENT
Start: 2023-08-22 | End: 2023-08-25 | Stop reason: HOSPADM

## 2023-08-22 RX ORDER — OXYCODONE HYDROCHLORIDE 10 MG/1
10 TABLET ORAL EVERY 6 HOURS PRN
Qty: 28 TABLET | Refills: 0
Start: 2023-08-22 | End: 2023-08-25 | Stop reason: HOSPADM

## 2023-08-22 RX ORDER — HYDRALAZINE HYDROCHLORIDE 20 MG/ML
5 INJECTION INTRAMUSCULAR; INTRAVENOUS ONCE
Status: COMPLETED | OUTPATIENT
Start: 2023-08-22 | End: 2023-08-22

## 2023-08-22 RX ORDER — IPRATROPIUM BROMIDE AND ALBUTEROL SULFATE 2.5; .5 MG/3ML; MG/3ML
1 SOLUTION RESPIRATORY (INHALATION)
Status: DISCONTINUED | OUTPATIENT
Start: 2023-08-22 | End: 2023-08-22

## 2023-08-22 RX ORDER — LIDOCAINE HYDROCHLORIDE 20 MG/ML
INJECTION, SOLUTION INTRAVENOUS PRN
Status: DISCONTINUED | OUTPATIENT
Start: 2023-08-22 | End: 2023-08-22 | Stop reason: SDUPTHER

## 2023-08-22 RX ORDER — DIPHENHYDRAMINE HYDROCHLORIDE 50 MG/ML
25 INJECTION INTRAMUSCULAR; INTRAVENOUS EVERY 6 HOURS PRN
Status: DISCONTINUED | OUTPATIENT
Start: 2023-08-22 | End: 2023-08-25 | Stop reason: HOSPADM

## 2023-08-22 RX ORDER — MIDAZOLAM HYDROCHLORIDE 1 MG/ML
INJECTION INTRAMUSCULAR; INTRAVENOUS PRN
Status: DISCONTINUED | OUTPATIENT
Start: 2023-08-22 | End: 2023-08-22 | Stop reason: SDUPTHER

## 2023-08-22 RX ORDER — SODIUM CHLORIDE 0.9 % (FLUSH) 0.9 %
5-40 SYRINGE (ML) INJECTION EVERY 12 HOURS SCHEDULED
Status: DISCONTINUED | OUTPATIENT
Start: 2023-08-22 | End: 2023-08-22

## 2023-08-22 RX ORDER — ONDANSETRON 4 MG/1
4 TABLET, ORALLY DISINTEGRATING ORAL EVERY 8 HOURS PRN
Qty: 30 TABLET | Refills: 0 | Status: SHIPPED | OUTPATIENT
Start: 2023-08-22

## 2023-08-22 RX ORDER — ACETAMINOPHEN 325 MG/1
650 TABLET ORAL EVERY 6 HOURS
Status: DISCONTINUED | OUTPATIENT
Start: 2023-08-22 | End: 2023-08-25 | Stop reason: HOSPADM

## 2023-08-22 RX ORDER — PROCHLORPERAZINE EDISYLATE 5 MG/ML
5 INJECTION INTRAMUSCULAR; INTRAVENOUS
Status: DISCONTINUED | OUTPATIENT
Start: 2023-08-22 | End: 2023-08-22

## 2023-08-22 RX ORDER — SODIUM CHLORIDE 9 MG/ML
INJECTION, SOLUTION INTRAVENOUS PRN
Status: DISCONTINUED | OUTPATIENT
Start: 2023-08-22 | End: 2023-08-22

## 2023-08-22 RX ORDER — PHENYLEPHRINE HCL IN 0.9% NACL 1 MG/10 ML
SYRINGE (ML) INTRAVENOUS PRN
Status: DISCONTINUED | OUTPATIENT
Start: 2023-08-22 | End: 2023-08-22 | Stop reason: SDUPTHER

## 2023-08-22 RX ORDER — SODIUM CHLORIDE 0.9 % (FLUSH) 0.9 %
5-40 SYRINGE (ML) INJECTION EVERY 12 HOURS SCHEDULED
Status: DISCONTINUED | OUTPATIENT
Start: 2023-08-22 | End: 2023-08-25 | Stop reason: HOSPADM

## 2023-08-22 RX ORDER — DOCUSATE SODIUM 100 MG/1
100 CAPSULE, LIQUID FILLED ORAL 2 TIMES DAILY PRN
Status: DISCONTINUED | OUTPATIENT
Start: 2023-08-22 | End: 2023-08-25 | Stop reason: HOSPADM

## 2023-08-22 RX ADMIN — MIDAZOLAM 1 MG: 1 INJECTION INTRAMUSCULAR; INTRAVENOUS at 11:19

## 2023-08-22 RX ADMIN — CHLORHEXIDINE GLUCONATE: 213 SOLUTION TOPICAL at 10:00

## 2023-08-22 RX ADMIN — HYDROMORPHONE HYDROCHLORIDE 0.5 MG: 1 INJECTION, SOLUTION INTRAMUSCULAR; INTRAVENOUS; SUBCUTANEOUS at 14:09

## 2023-08-22 RX ADMIN — Medication 100 MCG: at 12:04

## 2023-08-22 RX ADMIN — ROSUVASTATIN 20 MG: 10 TABLET, FILM COATED ORAL at 18:43

## 2023-08-22 RX ADMIN — LIDOCAINE HYDROCHLORIDE 60 MG: 20 INJECTION, SOLUTION INTRAVENOUS at 11:33

## 2023-08-22 RX ADMIN — SODIUM CHLORIDE, SODIUM LACTATE, POTASSIUM CHLORIDE, AND CALCIUM CHLORIDE: 600; 310; 30; 20 INJECTION, SOLUTION INTRAVENOUS at 10:35

## 2023-08-22 RX ADMIN — SODIUM CHLORIDE, SODIUM LACTATE, POTASSIUM CHLORIDE, AND CALCIUM CHLORIDE: 600; 310; 30; 20 INJECTION, SOLUTION INTRAVENOUS at 12:31

## 2023-08-22 RX ADMIN — PROPOFOL 30 MG: 10 INJECTION, EMULSION INTRAVENOUS at 11:33

## 2023-08-22 RX ADMIN — ACETAMINOPHEN 650 MG: 325 TABLET ORAL at 22:54

## 2023-08-22 RX ADMIN — FENTANYL CITRATE 25 MCG: 50 INJECTION, SOLUTION INTRAMUSCULAR; INTRAVENOUS at 13:55

## 2023-08-22 RX ADMIN — MELOXICAM 15 MG: 7.5 TABLET ORAL at 18:42

## 2023-08-22 RX ADMIN — MEPIVACAINE HYDROCHLORIDE 45 MG: 15 INJECTION, SOLUTION EPIDURAL; INFILTRATION at 11:24

## 2023-08-22 RX ADMIN — TRANEXAMIC ACID 1 G: 10 INJECTION, SOLUTION INTRAVENOUS at 11:42

## 2023-08-22 RX ADMIN — WATER 2000 MG: 1 INJECTION INTRAMUSCULAR; INTRAVENOUS; SUBCUTANEOUS at 20:32

## 2023-08-22 RX ADMIN — HYDROMORPHONE HYDROCHLORIDE 0.5 MG: 1 INJECTION, SOLUTION INTRAMUSCULAR; INTRAVENOUS; SUBCUTANEOUS at 14:20

## 2023-08-22 RX ADMIN — ACETAMINOPHEN 650 MG: 325 TABLET ORAL at 18:42

## 2023-08-22 RX ADMIN — APIXABAN 5 MG: 5 TABLET, FILM COATED ORAL at 22:57

## 2023-08-22 RX ADMIN — LABETALOL HYDROCHLORIDE 200 MG: 200 TABLET, FILM COATED ORAL at 22:56

## 2023-08-22 RX ADMIN — LABETALOL HYDROCHLORIDE 10 MG: 5 INJECTION INTRAVENOUS at 14:28

## 2023-08-22 RX ADMIN — PROPOFOL 50 MCG/KG/MIN: 10 INJECTION, EMULSION INTRAVENOUS at 11:37

## 2023-08-22 RX ADMIN — DILTIAZEM HYDROCHLORIDE 120 MG: 120 CAPSULE, EXTENDED RELEASE ORAL at 20:32

## 2023-08-22 RX ADMIN — SERTRALINE 100 MG: 50 TABLET, FILM COATED ORAL at 18:42

## 2023-08-22 RX ADMIN — LABETALOL HYDROCHLORIDE 15 MG: 5 INJECTION INTRAVENOUS at 14:48

## 2023-08-22 RX ADMIN — FENTANYL CITRATE 25 MCG: 50 INJECTION, SOLUTION INTRAMUSCULAR; INTRAVENOUS at 13:45

## 2023-08-22 RX ADMIN — MIDAZOLAM 1 MG: 1 INJECTION INTRAMUSCULAR; INTRAVENOUS at 11:15

## 2023-08-22 RX ADMIN — CLOPIDOGREL BISULFATE 75 MG: 75 TABLET ORAL at 18:42

## 2023-08-22 RX ADMIN — WATER 2000 MG: 1 INJECTION INTRAMUSCULAR; INTRAVENOUS; SUBCUTANEOUS at 11:35

## 2023-08-22 RX ADMIN — INSULIN LISPRO 10 UNITS: 100 INJECTION, SOLUTION INTRAVENOUS; SUBCUTANEOUS at 20:43

## 2023-08-22 RX ADMIN — HYDROMORPHONE HYDROCHLORIDE 0.5 MG: 1 INJECTION, SOLUTION INTRAMUSCULAR; INTRAVENOUS; SUBCUTANEOUS at 15:20

## 2023-08-22 RX ADMIN — LABETALOL HYDROCHLORIDE 10 MG: 5 INJECTION INTRAVENOUS at 14:14

## 2023-08-22 RX ADMIN — HYDRALAZINE HYDROCHLORIDE 5 MG: 20 INJECTION INTRAMUSCULAR; INTRAVENOUS at 15:08

## 2023-08-22 RX ADMIN — OXYCODONE HYDROCHLORIDE 5 MG: 5 TABLET ORAL at 18:42

## 2023-08-22 RX ADMIN — OXYCODONE HYDROCHLORIDE 10 MG: 5 TABLET ORAL at 22:55

## 2023-08-22 ASSESSMENT — PAIN DESCRIPTION - DESCRIPTORS
DESCRIPTORS: ACHING
DESCRIPTORS: ACHING
DESCRIPTORS: STABBING;SHARP
DESCRIPTORS: STABBING;SHARP
DESCRIPTORS: ACHING

## 2023-08-22 ASSESSMENT — PAIN - FUNCTIONAL ASSESSMENT
PAIN_FUNCTIONAL_ASSESSMENT: 0-10
PAIN_FUNCTIONAL_ASSESSMENT: ACTIVITIES ARE NOT PREVENTED
PAIN_FUNCTIONAL_ASSESSMENT: 0-10
PAIN_FUNCTIONAL_ASSESSMENT: NONE - DENIES PAIN
PAIN_FUNCTIONAL_ASSESSMENT: ADULT NONVERBAL PAIN SCALE (NPVS)
PAIN_FUNCTIONAL_ASSESSMENT: ADULT NONVERBAL PAIN SCALE (NPVS)
PAIN_FUNCTIONAL_ASSESSMENT: 0-10
PAIN_FUNCTIONAL_ASSESSMENT: ADULT NONVERBAL PAIN SCALE (NPVS)
PAIN_FUNCTIONAL_ASSESSMENT: 0-10
PAIN_FUNCTIONAL_ASSESSMENT: 0-10

## 2023-08-22 ASSESSMENT — PAIN DESCRIPTION - LOCATION
LOCATION: HIP
LOCATION: LEG
LOCATION: HIP
LOCATION: LEG

## 2023-08-22 ASSESSMENT — PAIN DESCRIPTION - ORIENTATION
ORIENTATION: LEFT

## 2023-08-22 ASSESSMENT — PAIN SCALES - GENERAL
PAINLEVEL_OUTOF10: 4
PAINLEVEL_OUTOF10: 0
PAINLEVEL_OUTOF10: 5
PAINLEVEL_OUTOF10: 0
PAINLEVEL_OUTOF10: 8
PAINLEVEL_OUTOF10: 6
PAINLEVEL_OUTOF10: 6
PAINLEVEL_OUTOF10: 8
PAINLEVEL_OUTOF10: 9
PAINLEVEL_OUTOF10: 7
PAINLEVEL_OUTOF10: 7

## 2023-08-22 NOTE — OP NOTE
OPERATIVE NOTE    Patient: Jefry Bone MRN: [id number]  SSN: [id number]    YOB: 1945  Age: 69 y.o. Sex: female      Indications: This is a 69y.o. year-old female who presents with hip pain. She was positive for hip OA. The patient was admitted for surgery as conservative measures have failed. Date of Procedure: 8/22/2023     Preoperative Diagnosis: Pre-Op Diagnosis Codes:     * Osteoarthritis of left hip, unspecified osteoarthritis type [M16.12]      Postoperative Diagnosis: Post-Op Diagnosis Codes:     * Osteoarthritis of left hip, unspecified osteoarthritis type [M16.12]      Procedure: Procedure(s):  LEFT TOTAL HIP ARTHROPLASTY ANTERIOR APPROACH WITH C-ARM \"SPEC POP\"    Anesthesia: Spinal    Surgeon: Alena Ortiz, and Surgical Assistant: Kristi Hussein PA-C    Assistant: Circulator: Marcie Dallas RN  Radiology Technologist: Espiridion Courtland  Relief Circulator: Kenzie Noble RN  Relief Scrub: Ruben Rose RN  Scrub Person First: Josr Watts RN  Scrub Person Second: Aleena Del Rosario  Physician Assistant: Paramjit Tenorio PA-C    Estimated Blood Loss: 200 ml    IVF 2000 ml    Specimens: * No specimens in log *     Drains: none    Implants:   Implant Name Type Inv.  Item Serial No.  Lot No. LRB No. Used Action   CUP ACET [id number] HIP GRIPTION JAMAL CEMENTLESS FIX SECT SER - [id number]  CUP ACET [id number] HIP GRIPTION JAMAL CEMENTLESS FIX SECT SER  [facility] [employer] [id number] Left 1 Implanted   LINER MOBILITY CAPPED HIP DUAL 52/45 - [id number]  LINER MOBILITY CAPPED HIP DUAL 52/45  [facility] [place] [id number] Left 1 Implanted   ELIMINATOR H APEX FOR 48-60MM [facility] HIP SHELL - [id number]  ELIMINATOR H APEX FOR 48-60MM [facility] HIP SHELL  [facility] [employer] [id number] Left 1 Implanted   pe liner     [id number]           Left 1 Implanted   STEM FEM SZ 11 STD OFFSET CLLRD 12/14 TAPR ACTIS ARTC EZ - [id number]  STEM FEM SZ 11 STD OFFSET CLLRD 12/14 closed using an Exofin skin closure system so that there was no discharge from the incision. This helps contribute to a lower risk of infection.      Charlie eVrgara DO  8/22/2023  2:51 PM

## 2023-08-22 NOTE — PERIOP NOTE
Patient treated for UTI- E. Coli in urine culture,asymptomatic on DOS. No repeat on PT/INR per Dr. Marivel Sauceda at this time.

## 2023-08-22 NOTE — PERIOP NOTE
Dr. Gerson Maurice  bed side to assess pt. Will give dilaudid 0.5 mag and repeat labetalol in 15 min.

## 2023-08-22 NOTE — PROGRESS NOTES
Occupational Therapy Goals:  Initiated 8/22/2023 to be met within 7 days. Patient will perform toileting with mod I  Patient will perform lower body dressing with mod I and A/E PRN  Patient will perform bathroom mobility with mod I  Patient will perform grooming standing at sink with mod I  Patient will perform toilet transfer with mod I      OCCUPATIONAL THERAPY EVALUATION    Patient: Sunni Gallardo (83 y.o. female)  Date: 8/22/2023  Primary Diagnosis: Osteoarthritis of left hip, unspecified osteoarthritis type [M16.12]  OA (osteoarthritis) of hip [M16.9]  Procedure(s) (LRB):  LEFT TOTAL HIP ARTHROPLASTY ANTERIOR APPROACH WITH C-ARM \"SPEC POP\" (Left) Day of Surgery   Precautions: Weight Bearing, Fall Risk,  , Left Lower Extremity Weight Bearing: Weight Bearing As Tolerated,  ,  ,  ,  , Hip Precautions: Anterior hip precautions  PLOF: pt mod I for ADLs/functional mobility, pt and spouse have home caregiver 7 days/wk 8:00-16:00    ASSESSMENT :  Based on the objective data described below, the patient presents with LLE decreased ROM and strength affecting LE ADLs. Pt found supine in bed, vitals assessed and WNL, pt reporting pain 4/10, agreeable to therapy. Pt able to sit up to EOB with CGA/additional time. Educated pt on proper body mechanics for ADLs s/p THR. Pt reports difficulty with sock/shoe donning, does own sock aid at home however has not used previously. Pt CGA for STS and to ambulate to bathroom door and back to EOB, vc for safe use of RW. Pt has questions about toileting at home, discussed used of BSC at night time and wearing depends which pt owns. Pt ambulated back to EOB and returned to supine with min A, ice applied to L hip. Patient will benefit from skilled Occupational Therapy intervention to maximize safety/independence with ADLs at d/c.    DEFICITS/IMPAIRMENTS:  Performance deficits / Impairments: Decreased functional mobility ; Decreased high-level IADLs;Decreased ADL status; Decreased infarction [facility]) 2018    Coronary artery disease     s/p stents x 3-  Dr. Alicia Trejo    Diabetes [facility]) 2013    Exercise involving housework     walks around house lives in one story    Female stress incontinence     Hypercholesterolemia     Hypertension     age 17    Kidney stone     having it removed   4/13/2023    Mixed hyperlipidemia     Morbid obesity ([address])     PONV (postoperative nausea and vomiting)     Sick sinus syndrome (HCC)     from [facility]- cardio note    Sleep apnea     cpap    Wears glasses     readers     Past Surgical History:   Procedure Laterality Date    APPENDECTOMY      BACK SURGERY      with cages    CARDIAC SURGERY      stents x 3    CATARACT EXTRACTION, BILATERAL      CHOLECYSTECTOMY      COLONOSCOPY      HYSTERECTOMY (CERVIX STATUS UNKNOWN)      JOINT REPLACEMENT Bilateral     knee replacement    LAP, PLACE ADJUST GASTR BAND  3/20/07    PACEMAKER PLACEMENT  2022    [place] [employer]    TOTAL HIP ARTHROPLASTY Right      Barriers to Learning/Limitations: physical, post-anesthesia  Compensate with: visual, verbal, tactile, kinesthetic cues/model    Home Situation:   Social/Functional History  Lives With: Spouse  Type of Home: House  Home Layout: One level  Home Access: Ramped entrance, Stairs to enter with rails  Entrance Stairs - Number of Steps: 3  Entrance Stairs - Rails: Both  Bathroom Shower/Tub: Walk-in shower  Bathroom Equipment: Shower chair, Toilet raiser  Home Equipment: Cane  []  Right hand dominant   []  Left hand dominant    Cognitive/Behavioral Status:  Orientation Level: Oriented to place;Oriented to situation;Oriented to person  WNL    Coordination: BUE  Coordination: Generally decreased, functional      Balance:  Balance  Sitting: Intact  Standing: Impaired  Standing - Static: Fair;Constant support  Standing - Dynamic: Fair;Constant support    Strength: BUE  Strength: Generally decreased, functional     Tone & Sensation: BUE  Tone: Normal  Sensation: Impaired (L hip)    Range of Motion:

## 2023-08-22 NOTE — PROGRESS NOTES
TRANSFER - IN REPORT:    Verbal report received from Linda Gordon RN on Bren Hopson  being received from PACU for routine post-op      Report consisted of patient's Situation, Background, Assessment and   Recommendations(SBAR). Information from the following report(s) Nurse Handoff Report, Adult Overview, Surgery Report, Intake/Output, MAR, and Recent Results was reviewed with the receiving nurse. Opportunity for questions and clarification was provided. Assessment completed upon patient's arrival to unit and care assumed.

## 2023-08-22 NOTE — PERIOP NOTE
TRANSFER - IN REPORT:    Verbal report received from OR nurse and CRNA on Laura Sanchez  being received from OR for routine post-op      Report consisted of patient's Situation, Background, Assessment and   Recommendations(SBAR). Information from the following report(s) Nurse Handoff Report, Adult Overview, Surgery Report, Intake/Output, MAR, Recent Results, and Med Rec Status was reviewed with the receiving nurse. Opportunity for questions and clarification was provided. Assessment completed upon patient's arrival to unit and care assumed.

## 2023-08-22 NOTE — INTERVAL H&P NOTE
Update History & Physical    The patient's History and Physical  was reviewed with the patient and I examined the patient. There was no change. The surgical site was confirmed by the patient and me. Plan: The risks, benefits, expected outcome, and alternative to the recommended procedure have been discussed with the patient. Patient understands and wants to proceed with the procedure.      Electronically signed by Daniel Scruggs DO on 8/22/2023 at 10:33 AM

## 2023-08-22 NOTE — ANESTHESIA PRE PROCEDURE
Department of Anesthesiology  Preprocedure Note       Name:  Boom Grey   Age:  69 y.o.  :  1945                                          MRN:  [id number]         Date:  2023      Surgeon: Lincoln Mcardle):  Presley Baker DO    Procedure: Procedure(s):  LEFT TOTAL HIP ARTHROPLASTY ANTERIOR APPROACH WITH C-ARM \"SPEC POP\"    Medications prior to admission:   Prior to Admission medications    Medication Sig Start Date End Date Taking?  Authorizing Provider   empagliflozin (JARDIANCE) 10 MG tablet Take by mouth 23   Historical Provider, MD   cloNIDine (CATAPRES) 0.1 MG tablet Take 1 tablet by mouth 2 times daily as needed (sbp >160) 23   Historical Provider, MD   nystatin-triamcinolone (MYCOLOG II) 113537-2.1 UNIT/GM-% cream Apply topically 23   Historical Provider, MD   rosuvastatin (CRESTOR) 20 MG tablet Take 1 tablet by mouth daily 23   Historical Provider, MD   clopidogrel (PLAVIX) 75 MG tablet Take 1 tablet by mouth daily    Historical Provider, MD   vitamin D3 (CHOLECALCIFEROL) 125 MCG (5000 UT) TABS tablet Take 1 tablet by mouth daily 22   Historical Provider, MD   meclizine (ANTIVERT) 12.5 MG tablet Take 1 tablet by mouth 3 times daily as needed    Historical Provider, MD   nitroGLYCERIN (NITROSTAT) 0.4 MG SL tablet Place 1 tablet under the tongue every 5 minutes as needed    Historical Provider, MD   ondansetron (ZOFRAN-ODT) 4 MG disintegrating tablet every 6 hours as needed 23   Historical Provider, MD   apixaban (ELIQUIS) 5 MG TABS tablet Take 1 tablet by mouth 2 times daily    Ar Automatic Reconciliation   dilTIAZem (TIAZAC) 120 MG extended release capsule Take 1 capsule by mouth daily    Ar Automatic Reconciliation   labetalol (NORMODYNE) 200 MG tablet Take 1 tablet by mouth 2 times daily    Ar Automatic Reconciliation   sertraline (ZOLOFT) 100 MG tablet Take 1 tablet by mouth daily    Ar Automatic Reconciliation       Current medications:    Current

## 2023-08-22 NOTE — BRIEF OP NOTE
Brief Postoperative Note      Patient: Jessica Torres  YOB: 1945  MRN: [id number]    Date of Procedure: 8/22/2023    Pre-Op Diagnosis Codes:     * Osteoarthritis of left hip, unspecified osteoarthritis type [M16.12]    Post-Op Diagnosis: Same       Procedure(s):  LEFT TOTAL HIP ARTHROPLASTY ANTERIOR APPROACH WITH C-ARM \"SPEC POP\"    Surgeon(s):  Norberto Quiroz DO    Assistant:  Physician Assistant: Army Vaishali PA-C    Anesthesia: Spinal    Estimated Blood Loss (mL): 422     Complications: None    Specimens:   * No specimens in log *    Implants:  Implant Name Type Inv.  Item Serial No.  Lot No. LRB No. Used Action   CUP ACET [id number] HIP GRIPTION JAMAL CEMENTLESS FIX SECT SER - [id number]  CUP ACET [id number] HIP GRIPTION JAMAL CEMENTLESS FIX SECT SER  [facility] [id number] Left 1 Implanted   LINER MOBILITY CAPPED HIP DUAL 52/45 - [id number]  LINER MOBILITY CAPPED HIP DUAL 52/45  [facility] [id number] Left 1 Implanted   ELIMINATOR H APEX FOR 48-60MM PINN HIP SHELL - [id number]  ELIMINATOR H APEX FOR 48-60MM PINN HIP SHELL  [facility] [id number] Left 1 Implanted   pe liner     [id number]           Left 1 Implanted   STEM FEM SZ 11 STD OFFSET CLLRD 12/14 TAPR ACTIS ARTC EZ - [id number]  STEM FEM SZ 11 STD OFFSET CLLRD 12/14 TAPR ACTIS ARTC EZ  [facility] [employer] [facility] [id number] Left 1 Implanted   femoral head     [id number] Left 1 Implanted         Drains: * No LDAs found *    Findings: severe oa hip      Electronically signed by Norberto Quiroz DO on 8/22/2023 at 2:50 PM

## 2023-08-22 NOTE — PROGRESS NOTES
Physical Therapy Goals:  Pt goals to be met in 1 day:  Pt will be able to perform supine<>sit SBA for transfer ease at home. Pt will be able to perform sit<>stand SBA for increased ability to transfer at home safely. Pt will be able to participate in gait training >60' w/ RW, WBAT, GB and CGA/SBA for improved ability in home upon d/c. Pt will be educated regarding HEP per MD protocol for optimal AROM/strength outcomes. []  Patient has met MD mobilization crilobo for d/c home   []  Recommend HH with 24 hour adult care   [x]  Benefit from additional acute PT session to address:  gait training    PHYSICAL THERAPY EVALUATION    Patient: Mary Mendoza (30 y.o. female)  Date: 8/22/2023  Primary Diagnosis: Osteoarthritis of left hip, unspecified osteoarthritis type [M16.12]  OA (osteoarthritis) of hip [M16.9]  Procedure(s) (LRB):  LEFT TOTAL HIP ARTHROPLASTY ANTERIOR APPROACH WITH C-ARM \"SPEC POP\" (Left) Day of Surgery   Precautions: Weight Bearing, Fall Risk,  , Left Lower Extremity Weight Bearing: Weight Bearing As Tolerated,  ,  ,  ,  , Hip Precautions: Anterior hip precautions  PLOF: Uses SPC for ambulation; had caregiver for herself and     ASSESSMENT :  Based on the objective data described below, the patient presents with decreased mobility in regards to bed mobility, transfers, gait quality, gait tolerance, balance, stair negotiation and safety due to L JANET surgery. Decreased AROM of L hip, dec strength L hip, pain in L hip, decreased sensation of L hip, also impacting functional mobility. Using numerical pain scale, pt rated pain 4/10 pre/during/post session. Pt ed regarding mobility safety, WB, HEP, ice application/use, elevation, environmental safety and use of call bell for activity. Pt supine in bed upon arrival.  Able to perform supine<>sit min A/CGA primarily for L LE management and sit<>stand w/ CGA. Safety vc required throughout session to reinforce safety.   Gait training using RW, GB, Strength: Generally decreased, functional  Tone & Sensation:   Tone: Normal  Sensation: Impaired (L hip)  Range Of Motion:  AROM: Generally decreased, functional  PROM: Generally decreased, functional  Functional Mobility:  Bed Mobility:  Bed Mobility Training  Bed Mobility Training: Yes  Interventions: Safety awareness training;Verbal cues; Tactile cues  Supine to Sit: Contact-guard assistance; Additional time;Minimum assistance  Sit to Supine: Contact-guard assistance;Minimum assistance; Additional time  Scooting: Contact-guard assistance  Transfers:  Transfer Training  Transfer Training: Yes  Interventions: Safety awareness training; Tactile cues; Verbal cues  Sit to Stand: Contact-guard assistance; Additional time  Stand to Sit: Contact-guard assistance; Additional time  Balance:   Balance  Sitting: Intact  Standing: Impaired  Standing - Static: Fair;Constant support  Standing - Dynamic: Fair;Constant support  Wheelchair Mobility:  Ambulation/Gait Training:  Gait Training  Gait Training: Yes  Left Side Weight Bearing: As tolerated  Gait  Overall Level of Assistance: Contact-guard assistance; Additional time  Interventions: Tactile cues; Safety awareness training;Verbal cues; Visual cues  Base of Support: Shift to right  Speed/Melani: Slow  Step Length: Right shortened;Left shortened  Swing Pattern: Left asymmetrical;Right asymmetrical  Stance: Left decreased  Gait Abnormalities: Antalgic;Decreased step clearance; Step to gait  Distance (ft): 3 Feet  Assistive Device: Gait belt;Walker, rolling  Therapeutic Exercises/Neuromuscular Re-education:     Pain:  Pain level pre-treatment: 4/10   Pain level post-treatment: 4/10   Pain Intervention(s): Medication (see MAR); Rest, Ice, Repositioning  Response to intervention: Nurse notified, See doc flow    Activity Tolerance:   Activity Tolerance: Patient tolerated evaluation without incident  Please refer to the flowsheet for vital signs taken during this treatment.     After

## 2023-08-22 NOTE — ANESTHESIA PROCEDURE NOTES
Spinal Block    Patient location during procedure: OR  End time: 8/22/2023 11:24 AM  Reason for block: primary anesthetic  Staffing  Performed: anesthesiologist   Anesthesiologist: Sabina Bello, DO  Spinal Block  Patient position: sitting  Prep: Betadine  Patient monitoring: cardiac monitor, continuous pulse ox and frequent blood pressure checks  Approach: midline  Location: L2/L3  Provider prep: mask and sterile gloves  Needle  Needle type: Pencan   Needle gauge: 25 G  Needle length: 3.5 in  Assessment  Sensory level: T8  Swirl obtained: Yes  CSF: clear  Attempts: 1  Hemodynamics: stable  Additional Notes  Easy spinal  Preanesthetic Checklist  Completed: patient identified, IV checked, site marked, risks and benefits discussed, surgical/procedural consents, equipment checked, pre-op evaluation, timeout performed, anesthesia consent given, oxygen available, monitors applied/VS acknowledged, fire risk safety assessment completed and verbalized and blood product R/B/A discussed and consented

## 2023-08-22 NOTE — PERIOP NOTE
Family updated regarding Pt's condition and care progress.       Dr. Malka Thurman at bedside assessing the pt

## 2023-08-22 NOTE — ANESTHESIA POSTPROCEDURE EVALUATION
Department of Anesthesiology  Postprocedure Note    Patient: Tanja Amato  MRN: [id number]  YOB: 1945  Date of evaluation: 8/22/2023      Procedure Summary     Date: 08/22/23 Room / Location: THE [facility] / [facility] MAIN OR    Anesthesia Start: 1115 Anesthesia Stop: 6428    Procedure: LEFT TOTAL HIP ARTHROPLASTY ANTERIOR APPROACH WITH C-ARM \"SPEC POP\" (Left: Hip) Diagnosis:       Osteoarthritis of left hip, unspecified osteoarthritis type      (Osteoarthritis of left hip, unspecified osteoarthritis type [M16.12])    Surgeons: Jade Hurley DO Responsible Provider: Sherrie Awad DO    Anesthesia Type: spinal ASA Status: 3          Anesthesia Type: No value filed.     Vickie Phase I: Vickie Score: 10    Vickie Phase II:        Anesthesia Post Evaluation    Patient location during evaluation: PACU  Patient participation: complete - patient participated  Level of consciousness: awake and alert  Pain score: 2  Airway patency: patent  Nausea & Vomiting: no nausea and no vomiting  Complications: no  Cardiovascular status: blood pressure returned to baseline (S/P several doses of labetalol.)  Respiratory status: acceptable  Hydration status: euvolemic  Multimodal analgesia pain management approach  Pain management: adequate

## 2023-08-22 NOTE — PERIOP NOTE
Reviewed PTA medication list with patient/caregiver and patient/caregiver denies any additional medications. Patient admits to having a responsible adult care for them at home for at least 24 hours after surgery. Patient encouraged to use gown warming system and informed that using said warming gown to regulate body temperature prior to a procedure has been shown to help reduce the risks of blood clots and infection. Patient's pharmacy of choice verified and documented in PTA medication section. Dual skin assessment & fall risk band verification completed with Shireen HELMS.

## 2023-08-22 NOTE — PERIOP NOTE
TRANSFER - OUT REPORT:    Verbal report given to [address] [place] [address]  on Brenda Enriquez  being transferred to [facility] for routine post-op       Report consisted of patient's Situation, Background, Assessment and   Recommendations(SBAR). Information from the following report(s) Nurse Handoff Report, Adult Overview, Surgery Report, Intake/Output, MAR, and Cardiac Rhythm afib with pvc's  was reviewed with the receiving nurse. Lines:   Peripheral IV 08/22/23 Right Forearm (Active)   Site Assessment Clean, dry & intact 08/22/23 1501   Line Status Infusing 08/22/23 1501   Phlebitis Assessment No symptoms 08/22/23 1501   Infiltration Assessment 0 08/22/23 1501   Dressing Status Clean, dry & intact 08/22/23 1501   Dressing Type Transparent 08/22/23 1257        Opportunity for questions and clarification was provided.       Patient transported with:  Registered Nurse

## 2023-08-23 ENCOUNTER — APPOINTMENT (OUTPATIENT)
Facility: HOSPITAL | Age: 78
DRG: 470 | End: 2023-08-23
Attending: ORTHOPAEDIC SURGERY
Payer: MEDICARE

## 2023-08-23 PROBLEM — I10 UNCONTROLLED HYPERTENSION: Status: ACTIVE | Noted: 2023-08-23

## 2023-08-23 PROBLEM — I48.91 ATRIAL FIBRILLATION (HCC): Status: ACTIVE | Noted: 2023-08-23

## 2023-08-23 PROBLEM — M19.90 OSTEOARTHRITIS: Status: ACTIVE | Noted: 2023-08-23

## 2023-08-23 LAB
ANION GAP SERPL CALC-SCNC: 4 MMOL/L (ref 3–18)
ARTERIAL PATENCY WRIST A: POSITIVE
BASE EXCESS BLD CALC-SCNC: 0.7 MMOL/L
BDY SITE: ABNORMAL
BUN SERPL-MCNC: 22 MG/DL (ref 7–18)
BUN/CREAT SERPL: 24 (ref 12–20)
CALCIUM SERPL-MCNC: 9.4 MG/DL (ref 8.5–10.1)
CHLORIDE SERPL-SCNC: 106 MMOL/L (ref 100–111)
CO2 SERPL-SCNC: 27 MMOL/L (ref 21–32)
CREAT SERPL-MCNC: 0.93 MG/DL (ref 0.6–1.3)
EKG DIAGNOSIS: NORMAL
EKG Q-T INTERVAL: 410 MS
EKG QRS DURATION: 82 MS
EKG QTC CALCULATION (BAZETT): 451 MS
EKG R AXIS: 31 DEGREES
EKG T AXIS: 25 DEGREES
EKG VENTRICULAR RATE: 73 BPM
ERYTHROCYTE [DISTWIDTH] IN BLOOD BY AUTOMATED COUNT: 15.7 % (ref 11.6–14.5)
ERYTHROCYTE [DISTWIDTH] IN BLOOD BY AUTOMATED COUNT: 15.8 % (ref 11.6–14.5)
GAS FLOW.O2 O2 DELIVERY SYS: ABNORMAL
GLUCOSE BLD STRIP.AUTO-MCNC: 129 MG/DL (ref 70–110)
GLUCOSE BLD STRIP.AUTO-MCNC: 132 MG/DL (ref 70–110)
GLUCOSE BLD STRIP.AUTO-MCNC: 139 MG/DL (ref 70–110)
GLUCOSE BLD STRIP.AUTO-MCNC: 164 MG/DL (ref 70–110)
GLUCOSE BLD STRIP.AUTO-MCNC: 164 MG/DL (ref 70–110)
GLUCOSE SERPL-MCNC: 121 MG/DL (ref 74–99)
HCO3 BLD-SCNC: 23.8 MMOL/L (ref 22–26)
HCT VFR BLD AUTO: 43.4 % (ref 35–45)
HCT VFR BLD AUTO: 44.2 % (ref 35–45)
HGB BLD-MCNC: 13.8 G/DL (ref 12–16)
HGB BLD-MCNC: 14 G/DL (ref 12–16)
LACTATE SERPL-SCNC: 1 MMOL/L (ref 0.4–2)
MCH RBC QN AUTO: 26.2 PG (ref 24–34)
MCH RBC QN AUTO: 26.4 PG (ref 24–34)
MCHC RBC AUTO-ENTMCNC: 31.7 G/DL (ref 31–37)
MCHC RBC AUTO-ENTMCNC: 31.8 G/DL (ref 31–37)
MCV RBC AUTO: 82.5 FL (ref 78–100)
MCV RBC AUTO: 83.2 FL (ref 78–100)
NRBC # BLD: 0 K/UL (ref 0–0.01)
NRBC # BLD: 0 K/UL (ref 0–0.01)
NRBC BLD-RTO: 0 PER 100 WBC
NRBC BLD-RTO: 0 PER 100 WBC
PCO2 BLD: 33 MMHG (ref 35–45)
PH BLD: 7.47 (ref 7.35–7.45)
PLATELET # BLD AUTO: 181 K/UL (ref 135–420)
PLATELET # BLD AUTO: 190 K/UL (ref 135–420)
PMV BLD AUTO: 12.1 FL (ref 9.2–11.8)
PMV BLD AUTO: 12.3 FL (ref 9.2–11.8)
PO2 BLD: 64 MMHG (ref 80–100)
POTASSIUM SERPL-SCNC: 3.9 MMOL/L (ref 3.5–5.5)
RBC # BLD AUTO: 5.26 M/UL (ref 4.2–5.3)
RBC # BLD AUTO: 5.31 M/UL (ref 4.2–5.3)
SAO2 % BLD: 93.4 % (ref 92–97)
SERVICE CMNT-IMP: ABNORMAL
SODIUM SERPL-SCNC: 137 MMOL/L (ref 136–145)
SPECIMEN TYPE: ABNORMAL
TROPONIN I SERPL HS-MCNC: 98 NG/L (ref 0–54)
WBC # BLD AUTO: 14.7 K/UL (ref 4.6–13.2)
WBC # BLD AUTO: 14.9 K/UL (ref 4.6–13.2)

## 2023-08-23 PROCEDURE — 82803 BLOOD GASES ANY COMBINATION: CPT

## 2023-08-23 PROCEDURE — 1100000003 HC PRIVATE W/ TELEMETRY

## 2023-08-23 PROCEDURE — 87186 SC STD MICRODIL/AGAR DIL: CPT

## 2023-08-23 PROCEDURE — 2580000003 HC RX 258: Performed by: PHYSICIAN ASSISTANT

## 2023-08-23 PROCEDURE — 36415 COLL VENOUS BLD VENIPUNCTURE: CPT

## 2023-08-23 PROCEDURE — 87086 URINE CULTURE/COLONY COUNT: CPT

## 2023-08-23 PROCEDURE — 85027 COMPLETE CBC AUTOMATED: CPT

## 2023-08-23 PROCEDURE — 84484 ASSAY OF TROPONIN QUANT: CPT

## 2023-08-23 PROCEDURE — 71045 X-RAY EXAM CHEST 1 VIEW: CPT

## 2023-08-23 PROCEDURE — 2580000003 HC RX 258: Performed by: ORTHOPAEDIC SURGERY

## 2023-08-23 PROCEDURE — 93005 ELECTROCARDIOGRAM TRACING: CPT | Performed by: FAMILY MEDICINE

## 2023-08-23 PROCEDURE — 82962 GLUCOSE BLOOD TEST: CPT

## 2023-08-23 PROCEDURE — 97530 THERAPEUTIC ACTIVITIES: CPT

## 2023-08-23 PROCEDURE — 6370000000 HC RX 637 (ALT 250 FOR IP): Performed by: PHYSICIAN ASSISTANT

## 2023-08-23 PROCEDURE — 81003 URINALYSIS AUTO W/O SCOPE: CPT

## 2023-08-23 PROCEDURE — 81001 URINALYSIS AUTO W/SCOPE: CPT

## 2023-08-23 PROCEDURE — 87077 CULTURE AEROBIC IDENTIFY: CPT

## 2023-08-23 PROCEDURE — 96374 THER/PROPH/DIAG INJ IV PUSH: CPT

## 2023-08-23 PROCEDURE — 2580000003 HC RX 258: Performed by: FAMILY MEDICINE

## 2023-08-23 PROCEDURE — 6360000002 HC RX W HCPCS: Performed by: PHYSICIAN ASSISTANT

## 2023-08-23 PROCEDURE — 80048 BASIC METABOLIC PNL TOTAL CA: CPT

## 2023-08-23 PROCEDURE — 83605 ASSAY OF LACTIC ACID: CPT

## 2023-08-23 PROCEDURE — 36600 WITHDRAWAL OF ARTERIAL BLOOD: CPT

## 2023-08-23 RX ORDER — HYDROCODONE BITARTRATE AND ACETAMINOPHEN 5; 325 MG/1; MG/1
1 TABLET ORAL EVERY 6 HOURS PRN
Status: DISCONTINUED | OUTPATIENT
Start: 2023-08-23 | End: 2023-08-25 | Stop reason: HOSPADM

## 2023-08-23 RX ORDER — INSULIN LISPRO 100 [IU]/ML
2-10 INJECTION, SOLUTION INTRAVENOUS; SUBCUTANEOUS
Status: DISCONTINUED | OUTPATIENT
Start: 2023-08-23 | End: 2023-08-25 | Stop reason: HOSPADM

## 2023-08-23 RX ORDER — 0.9 % SODIUM CHLORIDE 0.9 %
250 INTRAVENOUS SOLUTION INTRAVENOUS ONCE
Status: COMPLETED | OUTPATIENT
Start: 2023-08-23 | End: 2023-08-23

## 2023-08-23 RX ADMIN — ACETAMINOPHEN 650 MG: 325 TABLET ORAL at 23:43

## 2023-08-23 RX ADMIN — LABETALOL HYDROCHLORIDE 200 MG: 200 TABLET, FILM COATED ORAL at 08:27

## 2023-08-23 RX ADMIN — SERTRALINE 100 MG: 50 TABLET, FILM COATED ORAL at 08:29

## 2023-08-23 RX ADMIN — ACETAMINOPHEN 650 MG: 325 TABLET ORAL at 17:53

## 2023-08-23 RX ADMIN — ROSUVASTATIN 20 MG: 10 TABLET, FILM COATED ORAL at 08:29

## 2023-08-23 RX ADMIN — SODIUM CHLORIDE 250 ML: 9 INJECTION, SOLUTION INTRAVENOUS at 15:48

## 2023-08-23 RX ADMIN — ACETAMINOPHEN 650 MG: 325 TABLET ORAL at 11:53

## 2023-08-23 RX ADMIN — LABETALOL HYDROCHLORIDE 200 MG: 200 TABLET, FILM COATED ORAL at 21:40

## 2023-08-23 RX ADMIN — SODIUM CHLORIDE, PRESERVATIVE FREE 10 ML: 5 INJECTION INTRAVENOUS at 21:45

## 2023-08-23 RX ADMIN — MELOXICAM 15 MG: 7.5 TABLET ORAL at 08:28

## 2023-08-23 RX ADMIN — APIXABAN 5 MG: 5 TABLET, FILM COATED ORAL at 08:29

## 2023-08-23 RX ADMIN — ACETAMINOPHEN 650 MG: 325 TABLET ORAL at 03:51

## 2023-08-23 RX ADMIN — SODIUM CHLORIDE, SODIUM LACTATE, POTASSIUM CHLORIDE, AND CALCIUM CHLORIDE: 600; 310; 30; 20 INJECTION, SOLUTION INTRAVENOUS at 17:54

## 2023-08-23 RX ADMIN — ONDANSETRON 4 MG: 4 TABLET, ORALLY DISINTEGRATING ORAL at 11:25

## 2023-08-23 RX ADMIN — SODIUM CHLORIDE, PRESERVATIVE FREE 10 ML: 5 INJECTION INTRAVENOUS at 01:56

## 2023-08-23 RX ADMIN — DILTIAZEM HYDROCHLORIDE 120 MG: 120 CAPSULE, EXTENDED RELEASE ORAL at 09:54

## 2023-08-23 RX ADMIN — SODIUM CHLORIDE, PRESERVATIVE FREE 10 ML: 5 INJECTION INTRAVENOUS at 09:59

## 2023-08-23 RX ADMIN — WATER 2000 MG: 1 INJECTION INTRAMUSCULAR; INTRAVENOUS; SUBCUTANEOUS at 03:52

## 2023-08-23 RX ADMIN — ONDANSETRON 4 MG: 2 INJECTION INTRAMUSCULAR; INTRAVENOUS at 09:58

## 2023-08-23 RX ADMIN — CLOPIDOGREL BISULFATE 75 MG: 75 TABLET ORAL at 08:29

## 2023-08-23 RX ADMIN — INSULIN LISPRO 2 UNITS: 100 INJECTION, SOLUTION INTRAVENOUS; SUBCUTANEOUS at 12:20

## 2023-08-23 RX ADMIN — APIXABAN 5 MG: 5 TABLET, FILM COATED ORAL at 21:40

## 2023-08-23 ASSESSMENT — PAIN SCALES - GENERAL
PAINLEVEL_OUTOF10: 0
PAINLEVEL_OUTOF10: 2
PAINLEVEL_OUTOF10: 0
PAINLEVEL_OUTOF10: 5

## 2023-08-23 ASSESSMENT — PAIN DESCRIPTION - LOCATION
LOCATION: HIP
LOCATION: HEAD;SHOULDER
LOCATION: HIP

## 2023-08-23 ASSESSMENT — PAIN - FUNCTIONAL ASSESSMENT: PAIN_FUNCTIONAL_ASSESSMENT: ACTIVITIES ARE NOT PREVENTED

## 2023-08-23 ASSESSMENT — PAIN DESCRIPTION - DESCRIPTORS
DESCRIPTORS: ACHING
DESCRIPTORS: ACHING

## 2023-08-23 ASSESSMENT — PAIN DESCRIPTION - ORIENTATION
ORIENTATION: RIGHT
ORIENTATION: LEFT
ORIENTATION: LEFT

## 2023-08-23 NOTE — PROGRESS NOTES
Received client from PACU in satisfactory condition. Client is a pt of Dr. Jessica Castro. Pt had a Left total hip Replacement today. Client is A/O X 4. Client is calm and cooperative. Pt denies numbness or tingling to all extremities. Palpated Posterior Tibial and Dorsalis Pedis pulses equal in rate and rhythm bilaterally- +2. Capillary Refill less than 3 seconds. Skin is warm , dry and skin color is appropriate to race. Client is negative for JVD. Bibasilar breath sounds clear bilaterally. No use of accessory muscles. Bowel sounds hypoactive to all 4 quadrants. Abdomen is soft and non-tender. Client has not voided post-operatively. No bladder distention evident. No complaints of bladder discomfort. Client has a mepilex dressing to the  left hip. Dressing is clean, dry, and intact. No other skin integrity issues present. Sequential compression device applied. Client has 20 gauge PIV present in right forearm and running LR @125ml/hr. Clients pain is 5/10 on 0-10 scale. Medication will be given once verified by pharmacy. Client oriented to call bell use as well as bed use. Client oriented to phone and how to order meals. Call bell within reach. Bed in low position. Three side rails up. Dual skin assessment performed with Amadeo Nelson RN. No skin breakdown noted.  ID, allergy, fall risk, and blood bank bands intact and on wrist.

## 2023-08-23 NOTE — NURSE NAVIGATOR
Patient not progressing as expected with therapy. Only walked 4 ft today. Status changed to Inpatient per Utilization Review Physician advisor recommendation  per Medicare guidelines.

## 2023-08-23 NOTE — PROGRESS NOTES
Occupational Therapy Treatment Attempt    Chart reviewed. Attempted Occupational Therapy Treatment, however, patient unable to be seen due to:  []  Nausea/vomiting  []  Eating  []  Pain  []  Patient too lethargic  []  Off Unit for testing/procedure  []  Dialysis treatment in progress  []  Telemetry Results  [x]  Other: medic present in room placing new IV    Will f/u later as patient's schedule allows.    Thank you for this referral.  Hedy East, OTR/L

## 2023-08-23 NOTE — PROGRESS NOTES
0720  Bedside shift change report given to [address] (oncoming nurse) by Beata Pollard (offgoing nurse). Report included the following information Nurse Handoff Report. 0825  Assumed care at this time. Patient slightly drowsy. Denies SOB, chest pain. Shows no signs of distress. Patient lungs clear bilaterally. Cap refill < 3 sec to all extremities. Dressing to L hip is CDI. SCDs and gunjan stockings applied to BLE. Incentive spirometer at bedside. Patient reaches 750 on IS. Bowel sounds present. Excoriation to groin. Patient call light and possessions within reach. Bed locked and in lowest position. [address] to [facility]. Notified IV infiltrated when attempted to give zofran. Notified patient oriented to year, situation, place and person but this morning at 0825 she thought it was night. Patient also forgetful at times. C/o nausea. Patient became dizzy and BP dropped with PT. Verbal order: another oral dose zofran; Dc josé miguel/dilaudid/morphine and order Norco 5-325 mg Q6H prn.    1440  Spoke to [facility]. Notified patient has not had any pain meds this shift but that she is becoming more lethargic. Unable to work much with PT or keep eyes open with PT. States she does not feel well but unable to explain why. Instructed to contact hospitalist.    1450  Spoke to Dr. Nilo Rodrigues and notified of above. States she is placing orders. 1510  Dr. Nilo Rodrigues at bedside. Patient c/o discomfort to R shoulder. Medic, EKG and respiratory at bedside. 1608  TRANSFER - OUT REPORT:    Verbal report given to [address] on Nadia Fernandez  being transferred to telemetry. Report consisted of patient's Situation, Background, Assessment and   Recommendations(SBAR). Information from the following report(s) Nurse Handoff Report, Index, and MAR was reviewed with the receiving nurse. Opportunity for questions and clarification was provided.       Patient transported with:  Registered Nurse

## 2023-08-23 NOTE — CONSULTS
Medicine Consult    Patient:  Kiara Rico 69 y.o. female  Asked to evaluate patient by Dr. Marcheta Leventhal  Primary Care Provider:  Joann Lundberg MD  Date of Admission:  8/22/2023  Reason for Consult: BP management    Assessment/Plan   Uncontrolled hypertension -  Agree with resumption of home meds:   Labetalol 200mg po bid   Diltiazem CD once per day  Clonidine 0.1mg po bid   BP improving   Will conitnue to monitor and adjust meds as appropriate     Active Hospital Problems    Diagnosis Date Noted    Uncontrolled hypertension [I10] 08/23/2023    OA (osteoarthritis) of hip [M16.9] 08/22/2023       Thank you for allowing us to participate in this patients care. HPI:   CC: elevated BP    Kiara Rico is a 69 y.o. female with past medical history significant for HTN, DM, CAD with multiple stents underwent a left total hip arthroplasty today. Ortho consulted for assistance with uncontrolled hypertension. SBP in th3 180-200s. Pt states that she took her bp this mornign before surgery but has not had any evening meds. Ortho team restarted pt's cardizem and labetalol.      Past Medical History:   Diagnosis Date    Anxiety and depression     mild    Arthritis     left hip    Carotid arterial disease (HCC)      s/p left stent    Cerebral artery occlusion with cerebral infarction [facility]) 2018    Coronary artery disease     s/p stents x 3-  Dr. Zebedee Angelucci    Diabetes [facility]) 2013    Exercise involving housework     walks around house lives in one story    Female stress incontinence     Hypercholesterolemia     Hypertension     age 17    Kidney stone     having it removed   4/13/2023    Mixed hyperlipidemia     Morbid obesity ([address])     PONV (postoperative nausea and vomiting)     Sick sinus syndrome (HCC)     from [facility]- cardio note    Sleep apnea     cpap    Wears glasses     readers        Past Surgical History:   Procedure Laterality Date    APPENDECTOMY      BACK SURGERY      with cages    CARDIAC SURGERY      stents x 3
normal  No further cardiac testing is suggested. Continue to monitor on cardiac telemetry floor  Thank you for allowing me to participate in the management of this pleasant patient. Please feel free to contact me if you have any questions or concerns.     Signed By: Lydia Jolley MD     August 23, 2023

## 2023-08-23 NOTE — PROGRESS NOTES
Bedside and Verbal shift change report given to Bandar Contreras RN (oncoming nurse) by CHRISTEN Yeh RN (offgoing nurse). Report included the following information Nurse Handoff Report, Adult Overview, Surgery Report, Intake/Output, MAR, and Recent Results.

## 2023-08-23 NOTE — WOUND CARE
Wound Care Note:    Patient seen for hospital wide pressure injury prevalence. Full assessment completed, sacrum, heels and bilateral ischium intact, no redness noted to all areas except for lower buttocks in which incontinence dermatitis redness noted and skin protectant applied to this area with instruction to reapply as needed with each brief change. Patient repositioned at this time. Skin Care & Pressure Relief Recommendations:  Minimize layers of linen  Pads under patient to optimize support surface and microclimate  Turn/reposition approximately every 2 hours. Pillow Wedges  Manage incontinence  Promote continence; Skin Protective lotion to buttocks and sacrum daily and as needed with incontinence care    Offload heels with pillows or offloading boots.     Discussed above plan with patient     Transition of Care: Consult wound care if needed

## 2023-08-23 NOTE — PROGRESS NOTES
TRANSFER - IN REPORT:    Verbal report received from Jesu Mesa RN on Bren Hopson  being received from [address] [place] [address] for routine progression of patient care      Report consisted of patient's Situation, Background, Assessment and   Recommendations(SBAR). Information from the following report(s) Nurse Handoff Report, Surgery Report, Intake/Output, and Recent Results was reviewed with the receiving nurse. Opportunity for questions and clarification was provided. Assessment completed upon patient's arrival to unit and care assumed.

## 2023-08-23 NOTE — NURSE NAVIGATOR
Sunni Gallardo rounded on post anterior hip replacement. Discussed the following during rounding: Activity:  OOB for all meals. Promoting Circulation  Ankle pumps 10 times an hour at hospital & home. Make sure to walk short distances every hour while awake to help with muscle tightness, aching and soreness. Follow the exercises and mobility instructions provided by Physical Therapy. Change positions every hour to help ease stiffness and soreness. Pain Control:  Pain medications side effects discussed. Use ice, distraction, moving, & change position to help with pain. Rest between activity. Reminded narcotics and anesthesia cause constipation so need to take stool softener/mild laxative daily while on narcotics. Reviewed how to safely elevate the leg after exercises for 30 minutes and before bed to decrease swelling. Incentive Spirometry:    Use of incentive spirometer 10 x/hr  Diet:   Eat for healing. Drink enough fluids so urine is lemonade in color. .   Reminded medication can cause nausea if taken on an empty stomach so need to eat before taking them. Patient Safety:   Call light & belongings in reach. Call for help when want to walk or get OOB.

## 2023-08-23 NOTE — PROGRESS NOTES
Hospitalist Progress Note    Patient: Nancy Persaud MRN: [id number]  CSN: [id number]    YOB: 1945  Age: 69 y.o. Sex: female    DOA: 8/22/2023 LOS:  LOS: 0 days          Chief Complaint:    Elevated blood pressure     Assessment/Plan   70 y/o female with a history of coronary artery disease status post balloon angioplasty in July 2019, history of sick sinus syndrome status postcardiac pacemaker, history of atrial fibrillation, history of chronic anticoagulation, peripheral artery disease, CVA, hyperlipidemia, hypertension, obstructive sleep apnea, diabetes.   Patient underwent left hip replacement yesterday and had very high blood pressure postop so was consulted by Ortho team.     Uncontrolled hypertension -  Home medications were resumed overnight:   Labetalol 200mg po bid   Diltiazem CD once per day  Clonidine 0.1mg po bid   BP significantly overnight and now actually on the low side of things    Dizziness/lightheadedness -  RN called because Pt becoming more lethragic   When working with PT, very dizzy and lighhead and could not keep eyes open per PT and nurse   Came to bedside, pt alert and oriented x4   Sxs mostly seem to be associated with standing or walking   BP dropping: Systolic in the low 844W now  We will hold blood pressure meds for now and restart slowly  Ordered 250 cc normal saline bolus and restarted maintenance fluid  Transferred to cardiac floor for telemetry monitoring  We will consult cardiology for further evaluation  Additional plan:   -orthostatics  -12 lead EKG  -PCXR  -ABG   -CBC, BMP, troponin, magnesium   -lactic acid, UA, urine culture   -NS fluid bolus   -restart maintenance IVF    Leukocytosis-  May be related to surgery  We will order lactic acid, UA and urine culture sent above    Diabetes-  ADA diet, SSI, POC glucose before every meal and nightly    History of atrial fibrillation-  Resume Eliquis when okay with primary team  Telemetry monitoring    coronary artery

## 2023-08-23 NOTE — PROGRESS NOTES
Physical Therapy Goals:  Initiated 8/23/2023 to be met within 7-10 days. []  Patient has met MD diego vang for d/c home   []  Recommend HH with 24 hour adult care   [x]  Benefit from additional acute PT session to address:  bed mobility, ambulation      PHYSICAL THERAPY TREATMENT    Patient: Costa Asher (38 y.o. female)  Date: 8/23/2023  Diagnosis: Osteoarthritis of left hip, unspecified osteoarthritis type [M16.12]  OA (osteoarthritis) of hip [M16.9]  Osteoarthritis [M19.90] OA (osteoarthritis) of hip  Procedure(s) (LRB):  LEFT TOTAL HIP ARTHROPLASTY ANTERIOR APPROACH WITH C-ARM \"SPEC POP\" (Left) 1 Day Post-Op  Precautions: Weight Bearing, Fall Risk,  , Left Lower Extremity Weight Bearing: Weight Bearing As Tolerated,  ,  ,  ,  , Hip Precautions: Anterior hip precautions  PLOF: ambulatory with a cane, needs a RW    ASSESSMENT:  Assessment  Assessment: Pt supin in bed asleep, easily aroused. Reports feeling horrible. 6/10 pain at rest and s/p activity. Marquise to sit up EOB and for sit to stand. Ambulated 4 ft away from the bed then stated \"that's it. \"  When asked pt stated it is pain but still only reports a 6/10. Returned to bed, modA with LEs. Notified nurse, something is not right with the pt. Activity Tolerance: Patient limited by pain; Patient limited by fatigue  Discharge Recommendations: Subacute/Skilled Nursing Facility;Home with Home health PT    Progression toward goals:   []      Improving appropriately and progressing toward goals  [x]      Improving slowly and progressing toward goals  []      Not making progress toward goals and plan of care will be adjusted     PLAN:  Patient continues to benefit from skilled intervention to address the above impairments. Continue treatment per established plan of care.     Further Equipment Recommendations for Discharge:  ,  , Walker  Discharge Recommendation: Discharge Recommendations: Subacute/Skilled Nursing Facility;Home with Home health

## 2023-08-24 ENCOUNTER — APPOINTMENT (OUTPATIENT)
Facility: HOSPITAL | Age: 78
DRG: 470 | End: 2023-08-24
Attending: ORTHOPAEDIC SURGERY
Payer: MEDICARE

## 2023-08-24 PROBLEM — Z96.649 S/P HIP REPLACEMENT: Status: ACTIVE | Noted: 2023-08-24

## 2023-08-24 LAB
APPEARANCE UR: CLEAR
BACTERIA URNS QL MICRO: ABNORMAL /HPF
BILIRUB UR QL: NEGATIVE
COLOR UR: ABNORMAL
EPITH CASTS URNS QL MICRO: ABNORMAL /LPF (ref 0–5)
GLUCOSE BLD STRIP.AUTO-MCNC: 125 MG/DL (ref 70–110)
GLUCOSE BLD STRIP.AUTO-MCNC: 147 MG/DL (ref 70–110)
GLUCOSE BLD STRIP.AUTO-MCNC: 160 MG/DL (ref 70–110)
GLUCOSE BLD STRIP.AUTO-MCNC: 186 MG/DL (ref 70–110)
GLUCOSE UR STRIP.AUTO-MCNC: NEGATIVE MG/DL
HCT VFR BLD AUTO: 38.6 % (ref 35–45)
HGB BLD-MCNC: 12.3 G/DL (ref 12–16)
HGB UR QL STRIP: NEGATIVE
KETONES UR QL STRIP.AUTO: NEGATIVE MG/DL
LEUKOCYTE ESTERASE UR QL STRIP.AUTO: ABNORMAL
NITRITE UR QL STRIP.AUTO: NEGATIVE
PH UR STRIP: 5.5 (ref 5–8)
PROT UR STRIP-MCNC: NEGATIVE MG/DL
RBC #/AREA URNS HPF: ABNORMAL /HPF (ref 0–5)
SP GR UR REFRACTOMETRY: ABNORMAL (ref 1–1.03)
TROPONIN I SERPL HS-MCNC: 79 NG/L (ref 0–54)
UROBILINOGEN UR QL STRIP.AUTO: 0.2 EU/DL (ref 0.2–1)
WBC URNS QL MICRO: ABNORMAL /HPF (ref 0–5)

## 2023-08-24 PROCEDURE — 36415 COLL VENOUS BLD VENIPUNCTURE: CPT

## 2023-08-24 PROCEDURE — 6370000000 HC RX 637 (ALT 250 FOR IP): Performed by: PHYSICIAN ASSISTANT

## 2023-08-24 PROCEDURE — 85014 HEMATOCRIT: CPT

## 2023-08-24 PROCEDURE — 2580000003 HC RX 258: Performed by: PHYSICIAN ASSISTANT

## 2023-08-24 PROCEDURE — 85018 HEMOGLOBIN: CPT

## 2023-08-24 PROCEDURE — 70450 CT HEAD/BRAIN W/O DYE: CPT

## 2023-08-24 PROCEDURE — 97535 SELF CARE MNGMENT TRAINING: CPT

## 2023-08-24 PROCEDURE — 1100000003 HC PRIVATE W/ TELEMETRY

## 2023-08-24 PROCEDURE — 97116 GAIT TRAINING THERAPY: CPT

## 2023-08-24 PROCEDURE — 84484 ASSAY OF TROPONIN QUANT: CPT

## 2023-08-24 PROCEDURE — 97530 THERAPEUTIC ACTIVITIES: CPT

## 2023-08-24 PROCEDURE — 82962 GLUCOSE BLOOD TEST: CPT

## 2023-08-24 PROCEDURE — 2580000003 HC RX 258: Performed by: ORTHOPAEDIC SURGERY

## 2023-08-24 RX ADMIN — APIXABAN 5 MG: 5 TABLET, FILM COATED ORAL at 20:59

## 2023-08-24 RX ADMIN — SODIUM CHLORIDE, PRESERVATIVE FREE 10 ML: 5 INJECTION INTRAVENOUS at 09:05

## 2023-08-24 RX ADMIN — ACETAMINOPHEN 650 MG: 325 TABLET ORAL at 12:17

## 2023-08-24 RX ADMIN — INSULIN LISPRO 2 UNITS: 100 INJECTION, SOLUTION INTRAVENOUS; SUBCUTANEOUS at 09:03

## 2023-08-24 RX ADMIN — LABETALOL HYDROCHLORIDE 200 MG: 200 TABLET, FILM COATED ORAL at 20:59

## 2023-08-24 RX ADMIN — INSULIN LISPRO 2 UNITS: 100 INJECTION, SOLUTION INTRAVENOUS; SUBCUTANEOUS at 12:18

## 2023-08-24 RX ADMIN — MELOXICAM 15 MG: 7.5 TABLET ORAL at 09:04

## 2023-08-24 RX ADMIN — LABETALOL HYDROCHLORIDE 200 MG: 200 TABLET, FILM COATED ORAL at 09:04

## 2023-08-24 RX ADMIN — ACETAMINOPHEN 650 MG: 325 TABLET ORAL at 05:53

## 2023-08-24 RX ADMIN — DILTIAZEM HYDROCHLORIDE 120 MG: 120 CAPSULE, EXTENDED RELEASE ORAL at 09:04

## 2023-08-24 RX ADMIN — APIXABAN 5 MG: 5 TABLET, FILM COATED ORAL at 09:04

## 2023-08-24 RX ADMIN — SODIUM CHLORIDE, PRESERVATIVE FREE 10 ML: 5 INJECTION INTRAVENOUS at 21:02

## 2023-08-24 RX ADMIN — SODIUM CHLORIDE, SODIUM LACTATE, POTASSIUM CHLORIDE, AND CALCIUM CHLORIDE: 600; 310; 30; 20 INJECTION, SOLUTION INTRAVENOUS at 02:17

## 2023-08-24 RX ADMIN — SODIUM CHLORIDE, SODIUM LACTATE, POTASSIUM CHLORIDE, AND CALCIUM CHLORIDE: 600; 310; 30; 20 INJECTION, SOLUTION INTRAVENOUS at 09:09

## 2023-08-24 RX ADMIN — ACETAMINOPHEN 650 MG: 325 TABLET ORAL at 16:59

## 2023-08-24 RX ADMIN — ACETAMINOPHEN 650 MG: 325 TABLET ORAL at 23:20

## 2023-08-24 RX ADMIN — ROSUVASTATIN 20 MG: 10 TABLET, FILM COATED ORAL at 09:04

## 2023-08-24 RX ADMIN — SERTRALINE 100 MG: 50 TABLET, FILM COATED ORAL at 09:04

## 2023-08-24 RX ADMIN — CLOPIDOGREL BISULFATE 75 MG: 75 TABLET ORAL at 09:04

## 2023-08-24 ASSESSMENT — PAIN DESCRIPTION - LOCATION
LOCATION: LEG
LOCATION: HIP

## 2023-08-24 ASSESSMENT — PAIN DESCRIPTION - DESCRIPTORS: DESCRIPTORS: ACHING

## 2023-08-24 ASSESSMENT — PAIN DESCRIPTION - ORIENTATION: ORIENTATION: LEFT

## 2023-08-24 ASSESSMENT — PAIN SCALES - GENERAL
PAINLEVEL_OUTOF10: 4
PAINLEVEL_OUTOF10: 4
PAINLEVEL_OUTOF10: 0

## 2023-08-24 NOTE — PROGRESS NOTES
Hospitalist Progress Note    Patient: Enma Perdomo MRN: [id number]  CSN: [id number]    YOB: 1945  Age: 69 y.o. Sex: female    DOA: 8/22/2023 LOS:  LOS: 1 day          Chief Complaint:    dizziness      Assessment/Plan     70 y/o female with a history of coronary artery disease status post balloon angioplasty in July 2019, history of sick sinus syndrome status postcardiac pacemaker, history of atrial fibrillation, history of chronic anticoagulation, peripheral artery disease, CVA, hyperlipidemia, hypertension, obstructive sleep apnea, diabetes.   Patient underwent left hip replacement and had very high blood pressure postop so was consulted by Ortho team.      Uncontrolled hypertension -  improved  Labetalol 200mg po bid   Diltiazem CD once per day  Clonidine 0.1mg po bid   monitor     Dizziness/lightheadedness -  Better  Need to see how she does with PT today  CT head neg  Can stop IVF  Transferred to cardiac floor for telemetry monitoring     Leukocytosis-  May be related to surgery     Diabetes-  ADA diet, SSI, POC glucose before every meal and nightly     History of atrial fibrillation-  On eliquis  Telemetry monitoring     coronary artery disease status post balloon angioplasty in July 2019  Associated with history of sick sinus syndrome status postcardiac pacemaker  statin, Plavix  No angina    Monitor and see how she does today with activity  Pain is controlled    Get stat H&H        Disposition :  Active Hospital Problems    Diagnosis     Uncontrolled hypertension [I10]     Osteoarthritis [M19.90]     Atrial fibrillation (HCC) [I48.91]     OA (osteoarthritis) of hip [M16.9]     Diabetes ([address]) [E11.9]     Sleep apnea [G47.30]     Hypertension [I10]     Coronary artery disease [I25.10]        Subjective:    I feel ok  No pain  No dizziness  Just tired she states    Review of systems:    Constitutional: denies fevers, chills, myalgias  Respiratory: denies SOB  Cardiovascular: denies chest

## 2023-08-24 NOTE — CARE COORDINATION
D/C plan: Home w/ [facility]. Family to transport. Pt transitioned to tele from ortho.     1325: CM spoke w/ pt's dtr-in-law via phone w/ pt's permission. Confirmed that the caregiver that is in the home will assist the pt as well as the pt's spouse and that the caregiver does all of the household chores. Pt's dtr-in-law will also assist as needed and she is in agreement w/ the pt's plan of d/c home w/ [facility]. CM met w/ pt at bedside. Discussed therapy's recommendation of Home w/ [address] [facility] vs SNF. Pt states she will d/c home w/ [facility] as initially planned. CM inquired about help at home. Pt states that she has a private caregiver, her grandson and her dtr-in-law to assist in her care. Family to transport. CM to follow to assist w/ care transition.

## 2023-08-24 NOTE — PROGRESS NOTES
will be adjusted     PLAN:  Patient continues to benefit from skilled intervention to address the above impairments. Continue treatment per established plan of care. Further Equipment Recommendations for Discharge: n/a    Discharge Recommendation: Discharge Recommendations: Home with Home health OT;Subacute/Skilled Nursing Facility    AMPAC: Current research shows that an AM-PAC score of 18 or greater is associated with a discharge to the patient's home setting. Based on an AM-PAC score of 18/24 and their current ADL deficits; it is recommended that the patient have 2-3 sessions per week of Occupational Therapy at d/c to increase the patient's independence. This AMPAC score should be considered in conjunction with interdisciplinary team recommendations to determine the most appropriate discharge setting. Patient's social support, diagnosis, medical stability, and prior level of function should also be taken into consideration. SUBJECTIVE:   Patient stated \"I did more than I thought I could do\"    OBJECTIVE DATA SUMMARY:   Cognitive/Behavioral Status:  Orientation Level: Oriented to place;Oriented to situation;Oriented to person  [facility]    Functional Mobility and Transfers for ADLs:   Bed Mobility:  Bed Mobility Training  Bed Mobility Training: Yes  Supine to Sit: Contact-guard assistance; Additional time  Sit to Supine: Minimum assistance  Scooting: Additional time   Transfers:  Transfer Training  Transfer Training: Yes  Sit to Stand: Contact-guard assistance  Stand to Sit: Contact-guard assistance  Toilet Transfer: Contact-guard assistance    Balance:  Balance  Sitting: Intact  Standing: Impaired  Standing - Static: Good;Constant support  Standing - Dynamic: Fair;Constant support    ADL Intervention:  Equipment Provided: Sock aid  Feeding: Independent  Grooming: Stand by assistance  UE Bathing: Stand by assistance  LE Bathing:  Moderate assistance  UE Dressing: Supervision  LE Dressing: Minimal

## 2023-08-24 NOTE — PLAN OF CARE
Problem: Chronic Conditions and Co-morbidities  Goal: Patient's chronic conditions and co-morbidity symptoms are monitored and maintained or improved  8/24/2023 1029 by Marianna Rogers RN  Outcome: Progressing  Flowsheets (Taken 8/24/2023 0830)  Care Plan - Patient's Chronic Conditions and Co-Morbidity Symptoms are Monitored and Maintained or Improved:   Monitor and assess patient's chronic conditions and comorbid symptoms for stability, deterioration, or improvement   Collaborate with multidisciplinary team to address chronic and comorbid conditions and prevent exacerbation or deterioration   Update acute care plan with appropriate goals if chronic or comorbid symptoms are exacerbated and prevent overall improvement and discharge  8/24/2023 0410 by Janie Geronimo RN  Outcome: Progressing  Flowsheets (Taken 8/23/2023 2025)  Care Plan - Patient's Chronic Conditions and Co-Morbidity Symptoms are Monitored and Maintained or Improved:   Monitor and assess patient's chronic conditions and comorbid symptoms for stability, deterioration, or improvement   Update acute care plan with appropriate goals if chronic or comorbid symptoms are exacerbated and prevent overall improvement and discharge     Problem: Pain  Goal: Verbalizes/displays adequate comfort level or baseline comfort level  8/24/2023 1029 by Marianna Rogers RN  Outcome: Progressing  8/24/2023 0410 by Janie Geronimo RN  Outcome: Progressing  Flowsheets (Taken 8/23/2023 2025)  Verbalizes/displays adequate comfort level or baseline comfort level:   Encourage patient to monitor pain and request assistance   Assess pain using appropriate pain scale   Administer analgesics based on type and severity of pain and evaluate response     Problem: Safety - Adult  Goal: Free from fall injury  8/24/2023 1029 by Marianna Rogers RN  Outcome: Progressing  8/24/2023 0410 by Janie Geronimo RN  Outcome: Progressing  Flowsheets (Taken 8/23/2023 2025)  Free From Fall Injury:

## 2023-08-24 NOTE — PROGRESS NOTES
Physical Therapy Goals:  Initiated 8/24/2023 to be met within 7-10 days. []  Patient has met MD diego vang for d/c home   []  Recommend HH with 24 hour adult care   [x]  Benefit from additional acute PT session to address:  rehab placement      PHYSICAL THERAPY TREATMENT    Patient: Anselmo Weathers (24 y.o. female)  Date: 8/24/2023  Diagnosis: Osteoarthritis of left hip, unspecified osteoarthritis type [M16.12]  OA (osteoarthritis) of hip [M16.9]  Osteoarthritis [M19.90] OA (osteoarthritis) of hip  Procedure(s) (LRB):  LEFT TOTAL HIP ARTHROPLASTY ANTERIOR APPROACH WITH C-ARM \"SPEC POP\" (Left) 2 Days Post-Op  Precautions: Weight Bearing, Fall Risk,  , Left Lower Extremity Weight Bearing: Weight Bearing As Tolerated,  ,  ,  ,  , Hip Precautions: Anterior hip precautions  PLOF:     ASSESSMENT:  Assessment  Assessment: Pt supine in bed upon arrival.  Sleepy but easily aroused. Increased time and Marquise to sit up EOB. Marquise for sit to stand. Ambulated 20ft with RW, step to gt pattern, decreased pace, no LOB or path deviatons. No c/o lightheadedness or dizziness this session only very sleepy. Marquise back to supine. Activity Tolerance: Patient limited by endurance; Patient limited by fatigue  Discharge Recommendations: Subacute/Skilled Nursing Facility    Progression toward goals:   []      Improving appropriately and progressing toward goals  [x]      Improving slowly and progressing toward goals  []      Not making progress toward goals and plan of care will be adjusted     PLAN:  Patient continues to benefit from skilled intervention to address the above impairments. Continue treatment per established plan of care.     Further Equipment Recommendations for Discharge:RW  Discharge Recommendation: Discharge Recommendations: [address]: 13/20    This [facility] score should be considered in conjunction with interdisciplinary team recommendations to determine the most appropriate

## 2023-08-24 NOTE — PLAN OF CARE
Problem: Chronic Conditions and Co-morbidities  Goal: Patient's chronic conditions and co-morbidity symptoms are monitored and maintained or improved  Outcome: Progressing  Flowsheets (Taken 8/23/2023 2025)  Care Plan - Patient's Chronic Conditions and Co-Morbidity Symptoms are Monitored and Maintained or Improved:   Monitor and assess patient's chronic conditions and comorbid symptoms for stability, deterioration, or improvement   Update acute care plan with appropriate goals if chronic or comorbid symptoms are exacerbated and prevent overall improvement and discharge     Problem: Pain  Goal: Verbalizes/displays adequate comfort level or baseline comfort level  Outcome: Progressing  Flowsheets (Taken 8/23/2023 2025)  Verbalizes/displays adequate comfort level or baseline comfort level:   Encourage patient to monitor pain and request assistance   Assess pain using appropriate pain scale   Administer analgesics based on type and severity of pain and evaluate response     Problem: Safety - Adult  Goal: Free from fall injury  Outcome: Progressing  Flowsheets (Taken 8/23/2023 2025)  Free From Fall Injury: Instruct family/caregiver on patient safety     Problem: ABCDS Injury Assessment  Goal: Absence of physical injury  Outcome: Progressing  Flowsheets (Taken 8/23/2023 2025)  Absence of Physical Injury: Implement safety measures based on patient assessment     Problem: Discharge Planning  Goal: Discharge to home or other facility with appropriate resources  Outcome: Progressing  Flowsheets (Taken 8/23/2023 2025)  Discharge to home or other facility with appropriate resources:   Identify barriers to discharge with patient and caregiver   Arrange for needed discharge resources and transportation as appropriate   Identify discharge learning needs (meds, wound care, etc)     Problem: Skin/Tissue Integrity  Goal: Absence of new skin breakdown  Description: 1. Monitor for areas of redness and/or skin breakdown  2.   Assess vascular access sites hourly  3. Every 4-6 hours minimum:  Change oxygen saturation probe site  4. Every 4-6 hours:  If on nasal continuous positive airway pressure, respiratory therapy assess nares and determine need for appliance change or resting period.   Outcome: Progressing

## 2023-08-25 VITALS
HEIGHT: 67 IN | SYSTOLIC BLOOD PRESSURE: 155 MMHG | OXYGEN SATURATION: 97 % | BODY MASS INDEX: 33.56 KG/M2 | WEIGHT: 213.85 LBS | HEART RATE: 66 BPM | RESPIRATION RATE: 14 BRPM | DIASTOLIC BLOOD PRESSURE: 80 MMHG | TEMPERATURE: 98 F

## 2023-08-25 LAB
GLUCOSE BLD STRIP.AUTO-MCNC: 110 MG/DL (ref 70–110)
GLUCOSE BLD STRIP.AUTO-MCNC: 120 MG/DL (ref 70–110)
TROPONIN I SERPL HS-MCNC: 80 NG/L (ref 0–54)
TROPONIN I SERPL HS-MCNC: 80 NG/L (ref 0–54)

## 2023-08-25 PROCEDURE — 2580000003 HC RX 258: Performed by: PHYSICIAN ASSISTANT

## 2023-08-25 PROCEDURE — 36415 COLL VENOUS BLD VENIPUNCTURE: CPT

## 2023-08-25 PROCEDURE — 97116 GAIT TRAINING THERAPY: CPT

## 2023-08-25 PROCEDURE — 97530 THERAPEUTIC ACTIVITIES: CPT

## 2023-08-25 PROCEDURE — 6370000000 HC RX 637 (ALT 250 FOR IP): Performed by: PHYSICIAN ASSISTANT

## 2023-08-25 PROCEDURE — 82962 GLUCOSE BLOOD TEST: CPT

## 2023-08-25 PROCEDURE — 84484 ASSAY OF TROPONIN QUANT: CPT

## 2023-08-25 PROCEDURE — 97535 SELF CARE MNGMENT TRAINING: CPT

## 2023-08-25 RX ORDER — CIPROFLOXACIN 500 MG/1
500 TABLET, FILM COATED ORAL EVERY 12 HOURS SCHEDULED
Qty: 14 TABLET | Refills: 0 | Status: SHIPPED | OUTPATIENT
Start: 2023-08-25 | End: 2023-09-01

## 2023-08-25 RX ORDER — CIPROFLOXACIN 500 MG/1
500 TABLET, FILM COATED ORAL EVERY 12 HOURS SCHEDULED
Status: DISCONTINUED | OUTPATIENT
Start: 2023-08-25 | End: 2023-08-25 | Stop reason: HOSPADM

## 2023-08-25 RX ORDER — HYDROCODONE BITARTRATE AND ACETAMINOPHEN 5; 325 MG/1; MG/1
1 TABLET ORAL EVERY 6 HOURS PRN
Qty: 28 TABLET | Refills: 0 | Status: SHIPPED | OUTPATIENT
Start: 2023-08-25 | End: 2023-09-01

## 2023-08-25 RX ADMIN — ACETAMINOPHEN 650 MG: 325 TABLET ORAL at 05:33

## 2023-08-25 RX ADMIN — SERTRALINE 100 MG: 50 TABLET, FILM COATED ORAL at 08:53

## 2023-08-25 RX ADMIN — LABETALOL HYDROCHLORIDE 200 MG: 200 TABLET, FILM COATED ORAL at 08:52

## 2023-08-25 RX ADMIN — SODIUM CHLORIDE, PRESERVATIVE FREE 10 ML: 5 INJECTION INTRAVENOUS at 08:55

## 2023-08-25 RX ADMIN — APIXABAN 5 MG: 5 TABLET, FILM COATED ORAL at 08:53

## 2023-08-25 RX ADMIN — DILTIAZEM HYDROCHLORIDE 120 MG: 120 CAPSULE, EXTENDED RELEASE ORAL at 08:53

## 2023-08-25 RX ADMIN — ROSUVASTATIN 20 MG: 10 TABLET, FILM COATED ORAL at 08:52

## 2023-08-25 RX ADMIN — ACETAMINOPHEN 650 MG: 325 TABLET ORAL at 12:33

## 2023-08-25 RX ADMIN — CLOPIDOGREL BISULFATE 75 MG: 75 TABLET ORAL at 08:53

## 2023-08-25 ASSESSMENT — PAIN DESCRIPTION - DESCRIPTORS: DESCRIPTORS: ACHING

## 2023-08-25 ASSESSMENT — PAIN SCALES - GENERAL: PAINLEVEL_OUTOF10: 0

## 2023-08-25 NOTE — CARE COORDINATION
D/C plan: Home w/ [facility]. Family to transport. Pt will d/c home w/ [facility] and a private caregiver. Pt's family will transport her home. Pt and pt's dtr-in-law are in agreement w/ the d/c plan.

## 2023-08-25 NOTE — PLAN OF CARE
Problem: Chronic Conditions and Co-morbidities  Goal: Patient's chronic conditions and co-morbidity symptoms are monitored and maintained or improved  Outcome: Progressing  Flowsheets  Taken 8/25/2023 4578 by Joel Helm - Patient's Chronic Conditions and Co-Morbidity Symptoms are Monitored and Maintained or Improved:   Monitor and assess patient's chronic conditions and comorbid symptoms for stability, deterioration, or improvement   Collaborate with multidisciplinary team to address chronic and comorbid conditions and prevent exacerbation or deterioration   Update acute care plan with appropriate goals if chronic or comorbid symptoms are exacerbated and prevent overall improvement and discharge  Taken 8/24/2023 2059 by Joel Redman - Patient's Chronic Conditions and Co-Morbidity Symptoms are Monitored and Maintained or Improved:   Monitor and assess patient's chronic conditions and comorbid symptoms for stability, deterioration, or improvement   Update acute care plan with appropriate goals if chronic or comorbid symptoms are exacerbated and prevent overall improvement and discharge     Problem: Pain  Goal: Verbalizes/displays adequate comfort level or baseline comfort level  Outcome: Progressing  Flowsheets (Taken 8/24/2023 2059 by Samara Matthew RN)  Verbalizes/displays adequate comfort level or baseline comfort level:   Encourage patient to monitor pain and request assistance   Assess pain using appropriate pain scale   Administer analgesics based on type and severity of pain and evaluate response     Problem: Safety - Adult  Goal: Free from fall injury  Outcome: Progressing  Flowsheets (Taken 8/24/2023 2059 by Samara Matthew RN)  Free From Fall Injury: Instruct family/caregiver on patient safety     Problem: ABCDS Injury Assessment  Goal: Absence of physical injury  Outcome: Progressing  Flowsheets (Taken 8/24/2023 2059 by Samara Matthew RN)  Absence of Physical

## 2023-08-25 NOTE — PROGRESS NOTES
[x]  Patient has met MD diego vang for d/c home   [x]  Recommend HH with 24 hour adult care   []  Benefit from additional acute PT session to address:        PHYSICAL THERAPY TREATMENT    Patient: Verónica Baldwin (89 y.o. female)  Date: 8/25/2023  Diagnosis: Osteoarthritis of left hip, unspecified osteoarthritis type [M16.12]  OA (osteoarthritis) of hip [M16.9]  Osteoarthritis [M19.90] OA (osteoarthritis) of hip  Procedure(s) (LRB):  LEFT TOTAL HIP ARTHROPLASTY ANTERIOR APPROACH WITH C-ARM \"SPEC POP\" (Left) 3 Days Post-Op  Precautions: Weight Bearing, Fall Risk,Left Lower Extremity Weight Bearing: Weight Bearing As Tolerated, Hip Precautions: Anterior hip precautions    ASSESSMENT:  Patient progressing with mobility. Pt able to perform SBA. Stand transfers with SB-CGA. Pt ambulated 50 ft in hallway with CGA; step to antalgic gait pattern. Pt has ramp to enter home. Pt reports she also has a lift chair at home. Pt educated on icing, elevation, positioning, home safety, home exercise program, and activity recommendations. Home health physical therapy is recommended upon discharge from hospital. Recommend 24 hour supervision/caregiver assistance upon d/c. Progression toward goals:   [x]      Improving appropriately and progressing toward goals  []      Improving slowly and progressing toward goals  []      Not making progress toward goals and plan of care will be adjusted     PLAN:  Patient continues to benefit from skilled intervention to address the above impairments. Continue treatment per established plan of care. Further Equipment Recommendations for Discharge: Yes (RW)  Discharge Recommendation: Home with home health PT and family assist    AMPA: 18/24    This AMPAC score should be considered in conjunction with interdisciplinary team recommendations to determine the most appropriate discharge setting.  Patient's social support, diagnosis, medical stability, and prior level of function should also be taken into consideration. SUBJECTIVE:   Patient stated Subjective: I am ready to go home    OBJECTIVE DATA SUMMARY:   Critical Behavior:  Orientation Level: Oriented X4  WNL  Functional Mobility Training:  Bed Mobility:  Bed Mobility Training  Bed Mobility Training: Yes  Interventions: Safety awareness training;Verbal cues; Tactile cues  Supine to Sit: Stand-by assistance; Additional time  Sit to Supine: Stand-by assistance  Transfers:  Transfer Training  Transfer Training: Yes  Interventions: Safety awareness training; Tactile cues; Verbal cues  Sit to Stand: Stand-by assistance  Stand to Sit: Stand-by assistance  Toilet Transfer: Stand-by assistance  Balance:  Balance  Sitting: Intact  Standing: Impaired  Standing - Static: Good  Standing - Dynamic: Fair;Constant support   Ambulation/Gait Training:   Gait Training  Gait Training: Yes  Left Side Weight Bearing: As tolerated  Gait  Overall Level of Assistance: Contact-guard assistance;Stand-by assistance  Interventions: Tactile cues; Safety awareness training;Verbal cues; Visual cues  Base of Support: Shift to right  Speed/Melani: Slow  Step Length: Right shortened;Left shortened  Swing Pattern: Left asymmetrical;Right asymmetrical  Stance: Left decreased  Gait Abnormalities: Antalgic;Decreased step clearance; Step to gait  Distance (ft): 50 Feet  Assistive Device: Gait belt;Walker, rolling  Pain:  Pain level pre-treatment: 3/10  Pain level post-treatment: 3/10   Pain Intervention(s): Rest, Ice, Repositioning   Response to intervention: Nurse notified    Activity Tolerance:   Activity Tolerance: Patient tolerated treatment well  Please refer to the flowsheet for vital signs taken during this treatment.   After treatment:   [] Patient left in no apparent distress sitting up in chair  [x] Patient left in no apparent distress in bed  [x] Call bell left within reach  [x] Nursing notified  [] Caregiver present  [] Bed alarm activated  [] SCDs

## 2023-08-25 NOTE — PROGRESS NOTES
Physician Progress Note      PATIENT:               Josefina Orellana  CSN #:                  [id number]  :                       1945  ADMIT DATE:       2023 9:36 AM  [address] [facility] DATE:  [facility]  PROVIDER #:        Irene Gonzalez MD          QUERY TEXT:    Dear Hospitalist    Pt admitted and  s/p  THR  . Pt noted to have uncontrolled  HTN  post op. If   possible, please document in progress notes and discharge summary if you are   evaluating and/or treating any of the following: The medical record reflects the following:  Risk Factors:  Pt states that she took her bp this morning before surgery but   has not had any evening meds  Clinical Indicators: post op  B/P  199/109;   209/99;   188/90;   180/93; Treatment:  apresoline  5mg  IV     x 1  dose. labetolol  10mg  IV    x  2    doses. labetolol 15mg  IV  x 1. Home meds  restarted    Hypertensive Urgency: Defined as SBP of >180 OR DBP >120 w/o associated organ   damage. S/s may or may not be present, but can include severe headache, SOB,   epistaxis, severe anxiety. Tx: adjustment of oral BP medication; IV meds not   usually required. Hypertensive Emergency: SBP of >180 OR DBP >120 w/ associated organ damage   (CVA, MI, acute CHF, VALENTINA, encephalopathy, pulmonary edema, and unstable   angina). Documentation should include specific organ affected. Requires   immediate treatment, usually with IV meds. Hypertensive Crisis, unspecified: SBP of > 180 OR DBP > 120 and includes   damage to blood vessels, including inflammation, leakage of fluid or blood and   can cause stroke, headache, heart failure and eclampsia.   Source: [facility] and Quick Reference Guide    Thank you,   Sobeida Garcia RN   CCDS  Options provided:  -- Hypertensive Crisis  -- Hypertensive Emergency  -- Hypertensive Urgency  -- Other - I will add my own diagnosis  -- Disagree - Not applicable / Not valid  -- Disagree - Clinically unable to determine / Unknown  -- Refer to

## 2023-08-26 LAB
BACTERIA SPEC CULT: ABNORMAL
CC UR VC: ABNORMAL
SERVICE CMNT-IMP: ABNORMAL

## 2024-03-05 ENCOUNTER — HOSPITAL ENCOUNTER (OUTPATIENT)
Facility: HOSPITAL | Age: 79
Discharge: HOME | End: 2024-03-08
Payer: MEDICARE

## 2024-03-05 DIAGNOSIS — Z01.818 PRE-OP TESTING: ICD-10-CM

## 2024-03-05 LAB
ALBUMIN SERPL-MCNC: 3.8 G/DL (ref 3.4–5)
ALBUMIN/GLOB SERPL: 1.1 (ref 0.8–1.7)
ALP SERPL-CCNC: 160 U/L (ref 45–117)
ALT SERPL-CCNC: 22 U/L (ref 13–56)
ANION GAP SERPL CALC-SCNC: 6 MMOL/L (ref 3–18)
APPEARANCE UR: CLEAR
AST SERPL-CCNC: 17 U/L (ref 10–38)
BACTERIA URNS QL MICRO: ABNORMAL /HPF
BILIRUB SERPL-MCNC: 0.7 MG/DL (ref 0.2–1)
BILIRUB UR QL: NEGATIVE
BUN SERPL-MCNC: 21 MG/DL (ref 7–18)
BUN/CREAT SERPL: 25 (ref 12–20)
CALCIUM SERPL-MCNC: 10.7 MG/DL (ref 8.5–10.1)
CHLORIDE SERPL-SCNC: 113 MMOL/L (ref 100–111)
CO2 SERPL-SCNC: 25 MMOL/L (ref 21–32)
COLOR UR: YELLOW
CREAT SERPL-MCNC: 0.85 MG/DL (ref 0.6–1.3)
EPITH CASTS URNS QL MICRO: ABNORMAL /LPF (ref 0–5)
ERYTHROCYTE [DISTWIDTH] IN BLOOD BY AUTOMATED COUNT: 16.6 % (ref 11.6–14.5)
GLOBULIN SER CALC-MCNC: 3.5 G/DL (ref 2–4)
GLUCOSE SERPL-MCNC: 112 MG/DL (ref 74–99)
GLUCOSE UR STRIP.AUTO-MCNC: >1000 MG/DL
HBA1C MFR BLD: 6.7 % (ref 4.2–5.6)
HCT VFR BLD AUTO: 50.2 % (ref 35–45)
HGB BLD-MCNC: 16.2 G/DL (ref 12–16)
HGB UR QL STRIP: ABNORMAL
KETONES UR QL STRIP.AUTO: NEGATIVE MG/DL
LEUKOCYTE ESTERASE UR QL STRIP.AUTO: NEGATIVE
MCH RBC QN AUTO: 27.1 PG (ref 24–34)
MCHC RBC AUTO-ENTMCNC: 32.3 G/DL (ref 31–37)
MCV RBC AUTO: 83.9 FL (ref 78–100)
NITRITE UR QL STRIP.AUTO: NEGATIVE
NRBC # BLD: 0 K/UL (ref 0–0.01)
NRBC BLD-RTO: 0 PER 100 WBC
PH UR STRIP: 5 (ref 5–8)
PLATELET # BLD AUTO: 243 K/UL (ref 135–420)
PMV BLD AUTO: 11.4 FL (ref 9.2–11.8)
POTASSIUM SERPL-SCNC: 4.8 MMOL/L (ref 3.5–5.5)
PROT SERPL-MCNC: 7.3 G/DL (ref 6.4–8.2)
PROT UR STRIP-MCNC: 30 MG/DL
RBC # BLD AUTO: 5.98 M/UL (ref 4.2–5.3)
RBC #/AREA URNS HPF: ABNORMAL /HPF (ref 0–5)
SODIUM SERPL-SCNC: 144 MMOL/L (ref 136–145)
SP GR UR REFRACTOMETRY: 1.03 (ref 1–1.03)
UROBILINOGEN UR QL STRIP.AUTO: 0.2 EU/DL (ref 0.2–1)
WBC # BLD AUTO: 9.5 K/UL (ref 4.6–13.2)
WBC URNS QL MICRO: ABNORMAL /HPF (ref 0–5)

## 2024-03-05 PROCEDURE — 85027 COMPLETE CBC AUTOMATED: CPT

## 2024-03-05 PROCEDURE — 87186 SC STD MICRODIL/AGAR DIL: CPT

## 2024-03-05 PROCEDURE — 87086 URINE CULTURE/COLONY COUNT: CPT

## 2024-03-05 PROCEDURE — 83036 HEMOGLOBIN GLYCOSYLATED A1C: CPT

## 2024-03-05 PROCEDURE — 87077 CULTURE AEROBIC IDENTIFY: CPT

## 2024-03-05 PROCEDURE — 80053 COMPREHEN METABOLIC PANEL: CPT

## 2024-03-05 PROCEDURE — 81001 URINALYSIS AUTO W/SCOPE: CPT

## 2024-03-05 PROCEDURE — 36415 COLL VENOUS BLD VENIPUNCTURE: CPT

## 2024-03-06 NOTE — H&P
incontinence.  MUSCULOSKELETAL: Patient presents with joint stiffness and joint pain. Patient has no signs of fracture/dislocation, sprain/strain, tendonitis, rheumatoid disease, gout or swelling of feet.  INTEGUMENTARY:  Patient has no signs of rash/itching, psoriasis, Raynaud's phenomenon or varicose veins.  EMOTIONAL:  Patient has no signs of anxiety, depression, bipolar disorder, memory loss or change in mood.    Vitals:  Date BP Pulse Temp (F) Resp. (per min.) Height (Total in.) Weight (lbs.) BMI   01/29/2024     67.00 200.00 31.32     Physical Examination:    Psychiatric/General:  No acute distress; pleasant and accommodating.  HEENT:  Moist mucous membranes intact.  Lymphatic:  Neck is supple with no lymphadenopathy upon evaluation.  Respiratory:   Equal bilateral chest wall movement with inspiration; no wheezes or stridor audible.  Cardiovascular:    Regular rate and rhythm, as noted by upper extremity pulses.  Musculoskeletal: On evaluation of the right hand, she has tenderness upon palpation with motion in flexion and extension reproducing numbness in the median distribution. EMG shows severe carpal tunnel on the right.  No significant abnormality appreciated about the ulnar nerve in the left carpal tunnel or median nerve distribution noted.  X-rays have shown severe arthritic change, diffuse.      Assessment: Severe right carpal tunnel syndrome, worse than left, with limitations of activity. Certainly there is concern for upper extremity complaints, moderate arthritic change about the wrist and hand, as well as first CMC joint arthrosis.    Recommendation:    The patient has been given the risks and benefits of a carpal tunnel procedure including neurovascular compromise, persistent pain, or recurrent carpal tunnel syndrome requiring the need for further surgery.  The patient understands these risks and wishes to proceed. We will plan open carpal tunnel release on the right.          Tay Mariano, DO

## 2024-03-07 NOTE — DISCHARGE INSTRUCTIONS
Dr. Mariano's Post-Operative Instructions Carpal Tunnel Release    Diet:  1. Begin with liquids and light foods such as Jell-O and soups.  2. Advance as tolerated to your regular diet if not nauseated.    First 24 hours:  1. Be in the care of a responsible adult.  2. Do not drive or operate machinery.  3. Do not drink alcoholic beverages.    Activities:  1.  Elevate the operative hand and ice for the next 48 hours; 20 minutes on / 20 minutes off  2.  Return to work depends on your type of employment.  3.  Follow-up with Dr. Mariano in 7-10 days post-operatively.    Wound Care:  1. Maintain your postoperative dressing. Loosen the ACE wrap if swelling of the fingers occurs.      Medications:  1. Strong oral narcotic pain medications have been prescribed for the first few days. Use only as directed. No pain medication is capable of taking away all the pain. Taking your pills at regular intervals will give you the best chance of having less pain.  2. If you need a refill PLEASE PLAN AHEAD. Call our office during regular hours (8-5).  3. Do not combine with alcoholic beverages.  4. Be careful as you walk, climb stairs or drive as mild dizziness is not unusual.  5. Do not take medications that have not been prescribed by your surgeon.  6. You may switch to over the counter pain medication of your choice as you become more comfortable.    WHEN TO CALL YOUR SURGEON:  1. Significant swelling or any new numbness in the limb that was operated on  2. Unrelenting pain  3. Fever or Chills  4. Redness around incisions  5. Color change in the fingers or hand  6. Continuous drainage or bleeding from wounds (a small amount of drainage is expected)  7. Any other worrisome condition    WHEN TO CALL YOUR REGULAR DOCTOR:  1. Flare up of any of your regular medical conditions    WHEN TO CALL 911:  1. Chest Pain  2. Shortness of Breath  3. Any other acute serious condition    CALL THE OFFICE:  If you have severe pain unrelieved by the

## 2024-03-08 LAB
BACTERIA SPEC CULT: ABNORMAL
CC UR VC: ABNORMAL
SERVICE CMNT-IMP: ABNORMAL

## 2024-03-11 ENCOUNTER — ANESTHESIA EVENT (OUTPATIENT)
Facility: HOSPITAL | Age: 79
End: 2024-03-11
Payer: MEDICARE

## 2024-03-12 ENCOUNTER — ANESTHESIA (OUTPATIENT)
Facility: HOSPITAL | Age: 79
End: 2024-03-12
Payer: MEDICARE

## 2024-03-12 ENCOUNTER — HOSPITAL ENCOUNTER (OUTPATIENT)
Facility: HOSPITAL | Age: 79
Setting detail: OUTPATIENT SURGERY
Discharge: HOME | End: 2024-03-12
Attending: ORTHOPAEDIC SURGERY | Admitting: ORTHOPAEDIC SURGERY
Payer: MEDICARE

## 2024-03-12 VITALS
SYSTOLIC BLOOD PRESSURE: 154 MMHG | DIASTOLIC BLOOD PRESSURE: 88 MMHG | TEMPERATURE: 97.4 F | WEIGHT: 209.3 LBS | HEART RATE: 74 BPM | OXYGEN SATURATION: 98 % | RESPIRATION RATE: 16 BRPM | HEIGHT: 67 IN | BODY MASS INDEX: 32.85 KG/M2

## 2024-03-12 DIAGNOSIS — Z98.890 S/P CARPAL TUNNEL RELEASE: Primary | ICD-10-CM

## 2024-03-12 LAB — GLUCOSE BLD STRIP.AUTO-MCNC: 135 MG/DL (ref 70–110)

## 2024-03-12 PROCEDURE — 6360000002 HC RX W HCPCS: Performed by: ORTHOPAEDIC SURGERY

## 2024-03-12 PROCEDURE — 3700000001 HC ADD 15 MINUTES (ANESTHESIA): Performed by: ORTHOPAEDIC SURGERY

## 2024-03-12 PROCEDURE — 3600000002 HC SURGERY LEVEL 2 BASE: Performed by: ORTHOPAEDIC SURGERY

## 2024-03-12 PROCEDURE — 7100000011 HC PHASE II RECOVERY - ADDTL 15 MIN: Performed by: ORTHOPAEDIC SURGERY

## 2024-03-12 PROCEDURE — 6370000000 HC RX 637 (ALT 250 FOR IP): Performed by: STUDENT IN AN ORGANIZED HEALTH CARE EDUCATION/TRAINING PROGRAM

## 2024-03-12 PROCEDURE — 2500000003 HC RX 250 WO HCPCS

## 2024-03-12 PROCEDURE — 3700000000 HC ANESTHESIA ATTENDED CARE: Performed by: ORTHOPAEDIC SURGERY

## 2024-03-12 PROCEDURE — 7100000010 HC PHASE II RECOVERY - FIRST 15 MIN: Performed by: ORTHOPAEDIC SURGERY

## 2024-03-12 PROCEDURE — 2580000003 HC RX 258: Performed by: ORTHOPAEDIC SURGERY

## 2024-03-12 PROCEDURE — 2709999900 HC NON-CHARGEABLE SUPPLY: Performed by: ORTHOPAEDIC SURGERY

## 2024-03-12 PROCEDURE — 3600000012 HC SURGERY LEVEL 2 ADDTL 15MIN: Performed by: ORTHOPAEDIC SURGERY

## 2024-03-12 PROCEDURE — 6360000002 HC RX W HCPCS

## 2024-03-12 PROCEDURE — 82962 GLUCOSE BLOOD TEST: CPT

## 2024-03-12 RX ORDER — SODIUM CHLORIDE 9 MG/ML
INJECTION, SOLUTION INTRAVENOUS PRN
Status: CANCELLED | OUTPATIENT
Start: 2024-03-12

## 2024-03-12 RX ORDER — SCOLOPAMINE TRANSDERMAL SYSTEM 1 MG/1
1 PATCH, EXTENDED RELEASE TRANSDERMAL ONCE
Status: DISCONTINUED | OUTPATIENT
Start: 2024-03-12 | End: 2024-03-12 | Stop reason: HOSPADM

## 2024-03-12 RX ORDER — OXYCODONE HYDROCHLORIDE 5 MG/1
10 TABLET ORAL PRN
Status: CANCELLED | OUTPATIENT
Start: 2024-03-12 | End: 2024-03-12

## 2024-03-12 RX ORDER — ONDANSETRON 2 MG/ML
4 INJECTION INTRAMUSCULAR; INTRAVENOUS
Status: CANCELLED | OUTPATIENT
Start: 2024-03-12 | End: 2024-03-13

## 2024-03-12 RX ORDER — SODIUM CHLORIDE 0.9 % (FLUSH) 0.9 %
5-40 SYRINGE (ML) INJECTION PRN
Status: CANCELLED | OUTPATIENT
Start: 2024-03-12

## 2024-03-12 RX ORDER — SODIUM CHLORIDE 0.9 % (FLUSH) 0.9 %
5-40 SYRINGE (ML) INJECTION EVERY 12 HOURS SCHEDULED
Status: CANCELLED | OUTPATIENT
Start: 2024-03-12

## 2024-03-12 RX ORDER — HYDROMORPHONE HYDROCHLORIDE 1 MG/ML
0.25 INJECTION, SOLUTION INTRAMUSCULAR; INTRAVENOUS; SUBCUTANEOUS EVERY 5 MIN PRN
Status: CANCELLED | OUTPATIENT
Start: 2024-03-12

## 2024-03-12 RX ORDER — LIDOCAINE HYDROCHLORIDE 20 MG/ML
INJECTION, SOLUTION EPIDURAL; INFILTRATION; INTRACAUDAL; PERINEURAL PRN
Status: DISCONTINUED | OUTPATIENT
Start: 2024-03-12 | End: 2024-03-12 | Stop reason: SDUPTHER

## 2024-03-12 RX ORDER — PROPOFOL 10 MG/ML
INJECTION, EMULSION INTRAVENOUS PRN
Status: DISCONTINUED | OUTPATIENT
Start: 2024-03-12 | End: 2024-03-12 | Stop reason: SDUPTHER

## 2024-03-12 RX ORDER — HYDROCODONE BITARTRATE AND ACETAMINOPHEN 5; 325 MG/1; MG/1
1 TABLET ORAL EVERY 8 HOURS PRN
Qty: 21 TABLET | Refills: 0
Start: 2024-03-12 | End: 2024-03-19

## 2024-03-12 RX ORDER — SODIUM CHLORIDE 9 MG/ML
INJECTION, SOLUTION INTRAVENOUS CONTINUOUS
Status: CANCELLED | OUTPATIENT
Start: 2024-03-12

## 2024-03-12 RX ORDER — SODIUM CHLORIDE, SODIUM LACTATE, POTASSIUM CHLORIDE, CALCIUM CHLORIDE 600; 310; 30; 20 MG/100ML; MG/100ML; MG/100ML; MG/100ML
INJECTION, SOLUTION INTRAVENOUS CONTINUOUS
Status: DISCONTINUED | OUTPATIENT
Start: 2024-03-12 | End: 2024-03-12 | Stop reason: HOSPADM

## 2024-03-12 RX ORDER — MEPERIDINE HYDROCHLORIDE 50 MG/ML
12.5 INJECTION INTRAMUSCULAR; INTRAVENOUS; SUBCUTANEOUS EVERY 5 MIN PRN
Status: CANCELLED | OUTPATIENT
Start: 2024-03-12

## 2024-03-12 RX ORDER — FENTANYL CITRATE 50 UG/ML
50 INJECTION, SOLUTION INTRAMUSCULAR; INTRAVENOUS EVERY 5 MIN PRN
Status: CANCELLED | OUTPATIENT
Start: 2024-03-12

## 2024-03-12 RX ORDER — PROCHLORPERAZINE EDISYLATE 5 MG/ML
5 INJECTION INTRAMUSCULAR; INTRAVENOUS
Status: CANCELLED | OUTPATIENT
Start: 2024-03-12 | End: 2024-03-13

## 2024-03-12 RX ORDER — NALOXONE HYDROCHLORIDE 0.4 MG/ML
INJECTION, SOLUTION INTRAMUSCULAR; INTRAVENOUS; SUBCUTANEOUS PRN
Status: CANCELLED | OUTPATIENT
Start: 2024-03-12

## 2024-03-12 RX ORDER — OXYCODONE HYDROCHLORIDE 5 MG/1
5 TABLET ORAL PRN
Status: CANCELLED | OUTPATIENT
Start: 2024-03-12 | End: 2024-03-12

## 2024-03-12 RX ORDER — BUPIVACAINE HYDROCHLORIDE 2.5 MG/ML
INJECTION, SOLUTION EPIDURAL; INFILTRATION; INTRACAUDAL PRN
Status: DISCONTINUED | OUTPATIENT
Start: 2024-03-12 | End: 2024-03-12 | Stop reason: ALTCHOICE

## 2024-03-12 RX ADMIN — PROPOFOL 40 MG: 10 INJECTION, EMULSION INTRAVENOUS at 13:05

## 2024-03-12 RX ADMIN — LIDOCAINE HYDROCHLORIDE 100 MG: 20 INJECTION, SOLUTION EPIDURAL; INFILTRATION; INTRACAUDAL; PERINEURAL at 13:05

## 2024-03-12 RX ADMIN — WATER 2000 MG: 1 INJECTION INTRAMUSCULAR; INTRAVENOUS; SUBCUTANEOUS at 13:05

## 2024-03-12 RX ADMIN — PROPOFOL 50 MG: 10 INJECTION, EMULSION INTRAVENOUS at 13:11

## 2024-03-12 RX ADMIN — PROPOFOL 75 MCG/KG/MIN: 10 INJECTION, EMULSION INTRAVENOUS at 13:07

## 2024-03-12 RX ADMIN — SODIUM CHLORIDE, POTASSIUM CHLORIDE, SODIUM LACTATE AND CALCIUM CHLORIDE: 600; 310; 30; 20 INJECTION, SOLUTION INTRAVENOUS at 11:01

## 2024-03-12 ASSESSMENT — PAIN - FUNCTIONAL ASSESSMENT: PAIN_FUNCTIONAL_ASSESSMENT: 0-10

## 2024-03-12 NOTE — PERIOP NOTE
Pt took Eliquis and Plavix on the 3/10/2024. Erasmo ( OR5) notified, and after clarifying with Dr. Mariano, Erasmo stated Dr. Mariano stated no new order, and ok to proceed.

## 2024-03-12 NOTE — ANESTHESIA PRE PROCEDURE
within 24 Hrs      ROS comment: Preserved EF, Mod. Risk per cardiology.  Stopped Plavix and eliquis 5 days ago.     Neuro/Psych:   (+) CVA:, psychiatric history: stable with treatment             ROS comment: Lumbar spine surgery, no radiculopathy or peripheral neuropathy.  GI/Hepatic/Renal: Neg GI/Hepatic/Renal ROS            Endo/Other:    (+) DiabetesType II DM, well controlled.                  ROS comment: Active UTI Abdominal:             Vascular:          Other Findings:         Anesthesia Plan      MAC     ASA 3     (Significant comorbid conditions mac/local, underwent hip replacement without issues 6 months ago. Surgical team aware of active UTI)  Induction: intravenous.    MIPS: Postoperative opioids intended and Prophylactic antiemetics administered.  Anesthetic plan and risks discussed with patient.      Plan discussed with CRNA.    Attending anesthesiologist reviewed and agrees with Preprocedure content              Jermain Street MD   3/12/2024

## 2024-03-12 NOTE — ANESTHESIA POSTPROCEDURE EVALUATION
Department of Anesthesiology  Postprocedure Note    Patient: Heather Baxter  MRN: [id number]  YOB: 1945  Date of evaluation: 3/12/2024    Procedure Summary       Date: 03/12/24 Room / Location: [facility] MAIN 07 / [facility] MAIN OR    Anesthesia Start: 1259 Anesthesia Stop: 1334    Procedure: RIGHT OPEN CARPAL TUNNEL RELEASE \"SPEC POP\" PT HAS [employer]AKER\" (Right: Fingers) Diagnosis:       Carpal tunnel syndrome, right      (Carpal tunnel syndrome, right [G56.01])    Surgeons: Tay Mariano DO Responsible Provider: Jermain Street MD    Anesthesia Type: MAC ASA Status: 3            Anesthesia Type: MAC    Vickie Phase I:      Vickie Phase II: Vickie Score: 10    Anesthesia Post Evaluation    Patient location during evaluation: bedside  Patient participation: complete - patient participated  Level of consciousness: awake and alert  Pain score: 0  Airway patency: patent  Nausea & Vomiting: no nausea  Cardiovascular status: blood pressure returned to baseline and hemodynamically stable  Respiratory status: acceptable  Hydration status: euvolemic  Pain management: adequate    No notable events documented.

## 2024-03-12 NOTE — INTERVAL H&P NOTE
Update History & Physical    The patient's History and Physical  was reviewed with the patient and I examined the patient. There was no change. The surgical site was confirmed by the patient and me.     Plan: The risks, benefits, expected outcome, and alternative to the recommended procedure have been discussed with the patient. Patient understands and wants to proceed with the procedure.     Electronically signed by Tay Mariano DO on 3/12/2024 at 10:53 AM

## 2024-03-12 NOTE — PERIOP NOTE
TRANSFER - IN REPORT:    Verbal report received from Lisa on Heather Baxter  being received from OR for routine post-op      Report consisted of patient's Situation, Background, Assessment and   Recommendations(SBAR).     Information from the following report(s) Nurse Handoff Report, Adult Overview, Surgery Report, Intake/Output, and MAR was reviewed with the receiving nurse.    Opportunity for questions and clarification was provided.      Assessment completed upon patient's arrival to unit and care assumed.

## 2024-03-12 NOTE — BRIEF OP NOTE
Brief Postoperative Note      Patient: Heather Baxter  YOB: 1945  MRN: [id number]    Date of Procedure: 3/12/2024    Pre-Op Diagnosis Codes:     * Carpal tunnel syndrome, right [G56.01]    Post-Op Diagnosis: Same       Procedure(s):  RIGHT OPEN CARPAL TUNNEL RELEASE \"SPEC POP\" PT HAS [employer] PACEMAKER\"    Surgeon(s):  Tay Mariano DO    Assistant:  Physician Assistant: Bessy Brewer PA-C    Anesthesia: Monitor Anesthesia Care    Estimated Blood Loss (mL): Minimal    Complications: None    Specimens:   * No specimens in log *    Implants:  * No implants in log *      Drains: * No LDAs found *    Findings: thickened transverse ligament       Electronically signed by Tay Mariano DO on 3/12/2024 at 2:28 PM

## 2024-04-10 NOTE — OP NOTE
completely resected.  At this point, evaluation of the carpal tunnel showed no evidence of abnormalities otherwise.  The tissues were irrigated with 300 ml of sterile saline with bacitracin utilizing a Asepto syringe.  The incision was subsequently reapproximated with 3-0 nylon sutures in horizontal mattress fashion x3.  A soft sterile dressing was subsequently placed.  The patient recovered from anesthesia and was transferred to the post anesthesia care unit in stable condition, where the patient was found to be neurovascularly intact.    Tay Mariano DO  3/12/2024  2:28 PM   4 = No assist / stand by assistance

## 2025-03-11 ENCOUNTER — APPOINTMENT (OUTPATIENT)
Facility: HOSPITAL | Age: 80
End: 2025-03-11
Payer: MEDICARE

## 2025-03-11 ENCOUNTER — HOSPITAL ENCOUNTER (EMERGENCY)
Facility: HOSPITAL | Age: 80
Discharge: HOME | End: 2025-03-11
Payer: MEDICARE

## 2025-03-11 VITALS
HEART RATE: 80 BPM | OXYGEN SATURATION: 100 % | BODY MASS INDEX: 31.39 KG/M2 | SYSTOLIC BLOOD PRESSURE: 162 MMHG | RESPIRATION RATE: 16 BRPM | HEIGHT: 67 IN | WEIGHT: 200 LBS | TEMPERATURE: 97.6 F | DIASTOLIC BLOOD PRESSURE: 82 MMHG

## 2025-03-11 DIAGNOSIS — M25.552 LEFT HIP PAIN: ICD-10-CM

## 2025-03-11 DIAGNOSIS — M53.3 SACRAL PAIN: ICD-10-CM
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
## 2025-03-11 PROCEDURE — 73521 X-RAY EXAM HIPS BI 2 VIEWS: CPT

## 2025-03-11 PROCEDURE — 99283 EMERGENCY DEPT VISIT LOW MDM: CPT

## 2025-03-11 RX ORDER — LIDOCAINE 50 MG/G
1 PATCH TOPICAL DAILY
Qty: 10 PATCH | Refills: 0 | Status: SHIPPED | OUTPATIENT
Start: 2025-03-11 | End: 2025-03-21

## 2025-03-11 NOTE — ED TRIAGE NOTES
Patient arrived via EMS from home c/o fall. States that she was standing and knees gave up out of her. Fell onto buttock. Patient states she is not in any pain at this time however when she attempts to get back up, she does not have the mechanical capacity to hold herself back up.    No deformities, bruising, rotation or shortening.     Patient did not hit her head, no LOC, takes 2 blood thinners.     Hx hip replacement, Dr. Curry orthopaedics.

## 2025-03-11 NOTE — ED PROVIDER NOTES
[facility]  EMERGENCY DEPARTMENT ENCOUNTER         Pt Name: Heather Baxter  MRN: [id number]  Birthdate 1945  Date of evaluation: 3/11/2025  Provider: Bushra Aldrich PA-C   PCP: Adolfo Sky Jr., MD  Note Started: 6:41 PM EDT 3/11/25     CHIEF COMPLAINT       Chief Complaint   Patient presents with    Fall        HISTORY OF PRESENT ILLNESS: 1 or more elements      History From: Patient  HPI Limitations: None     Heather Baxter is a 79 y.o. female who presents via EMS after experiencing a fall in her living room today.  Patient states that ever since she had her bilateral knee replacements she has had unsteady legs.  She states that she had a mechanical fall due to her joint instability and landed on her tailbone.  Patient denies hitting her head or loss of consciousness.  She is on 2 blood thinners at this time.  Patient has full range of motion bilateral upper and lower extremities no difficulty.  She has some tenderness while turning to the left.  Some tenderness in the hip joint some tenderness to the sacrum and coccyx.  Patient has a history of bilateral hip and knee replacements.  No obvious deformity on exam.  Patient declines pain medication at this time states that she has no pain.     Nursing Notes were all reviewed and agreed with or any disagreements were addressed in the HPI.  Please see MDM for additional details of HPI and ROS     REVIEW OF SYSTEMS      Review of Systems   Musculoskeletal:  Positive for arthralgias and myalgias.   All other systems reviewed and are negative.       Positives and Pertinent negatives as per HPI.    PAST HISTORY     Past Medical History:  Past Medical History:   Diagnosis Date    Afib ([facility])     Anxiety and depression     mild    Arthritis     left hip- OA    Carotid arterial disease      s/p left stent    Cerebral artery occlusion with cerebral infarction ([facility]) 2018    Coronary artery disease     s/p stents x 3-  Dr. Galindo    Diabetes ([facility])

## (undated) DEVICE — GLOVE SURG SZ 7 L12IN FNGR THK79MIL GRN LTX FREE

## (undated) DEVICE — TOWEL,OR,DSP,ST,BLUE,STD,4/PK,20PK/CS: Brand: MEDLINE

## (undated) DEVICE — PACK PROCEDURE SURG ANTR HIP

## (undated) DEVICE — GLOVE ORANGE PI 8 1/2   MSG9085

## (undated) DEVICE — GLOVE ORANGE PI 7   MSG9070

## (undated) DEVICE — GLOVE SURG SZ 85 L12IN FNGR ORTHO 126MIL CRM LTX FREE

## (undated) DEVICE — PACK PROCEDURE SURG EXTREMITY CUST

## (undated) DEVICE — SOLUTION IRRIG 500ML 0.9% SOD CHLO USP POUR PLAS BTL

## (undated) DEVICE — BANDAGE,ELASTIC,ESMARK,STERILE,4"X9',LF: Brand: MEDLINE

## (undated) DEVICE — HANDPIECE SET WITH HIGH FLOW TIP AND SUCTION TUBE: Brand: INTERPULSE

## (undated) DEVICE — APPLICATOR MEDICATED 26 CC SOLUTION HI LT ORNG CHLORAPREP

## (undated) DEVICE — BONE WAX WHITE: Brand: BONE WAX WHITE

## (undated) DEVICE — SUTURE ETHLN SZ 3-0 L18IN NONABSORBABLE BLK PS-2 L19MM 3/8 1669H

## (undated) DEVICE — DRESSING,GAUZE,XEROFORM,CURAD,1"X8",ST: Brand: CURAD

## (undated) DEVICE — HOOD, PEEL-AWAY: Brand: FLYTE

## (undated) DEVICE — DRESSING ALG W4XL8IN AG FOAM SUPERABSORBENT SIL ANTIMIC

## (undated) DEVICE — STRYKER PERFORMANCE SERIES SAGITTAL BLADE: Brand: STRYKER PERFORMANCE SERIES

## (undated) DEVICE — SUTURE VCRL + SZ 2-0 L27IN ABSRB UD CT-2 L26MM 1/2 CIR TAPR VCP269H

## (undated) DEVICE — SOLUTION IV 1000ML LAC RINGERS PH 6.5 INJ USP VIAFLX PLAS

## (undated) DEVICE — NEEDLE SPNL 20GA L3.5IN YEL HUB S STL REG WALL FIT STYL W/

## (undated) DEVICE — GARMENT,MEDLINE,DVT,INT,CALF,MED, GEN2: Brand: MEDLINE

## (undated) DEVICE — SYRINGE MED 30ML STD CLR PLAS LUERLOCK TIP N CTRL DISP

## (undated) DEVICE — SUTURE VCRL + SZ 1 L36IN ABSRB UD L36MM CT-1 1/2 CIR VCP947H

## (undated) DEVICE — GOWN,SIRUS,NONRNF,SETINSLV,XL,20/CS: Brand: MEDLINE

## (undated) DEVICE — PADDING,UNDERCAST,COTTON, 3X4YD STERILE: Brand: MEDLINE

## (undated) DEVICE — THE CANADY HYBRID PLASMA SCALPEL IS AN ELECTROSURGICAL PLASMA SCALPEL THAT USES AN 85MM BENDABLE PADDLE BLADE TIP. THE ELECTROSURGICAL PLASMA SCALPEL IS USED TO SIMULTANEOUSLY CUT AND COAGULATE BIOLOGICAL TISSUE.: Brand: CANADY HYBRID PLASMA PADDLE BLADE

## (undated) DEVICE — BNDG,ELSTC,MATRIX,STRL,3"X5YD,LF,HOOK&LP: Brand: MEDLINE

## (undated) DEVICE — ADHESIVE SKIN CLOSURE WND 8.661X1.5 IN 22 CM LIQUIBAND SECUR